# Patient Record
Sex: FEMALE | Race: WHITE | NOT HISPANIC OR LATINO | Employment: OTHER | URBAN - METROPOLITAN AREA
[De-identification: names, ages, dates, MRNs, and addresses within clinical notes are randomized per-mention and may not be internally consistent; named-entity substitution may affect disease eponyms.]

---

## 2018-09-26 ENCOUNTER — AMBULATORY - HEALTHEAST (OUTPATIENT)
Dept: PHYSICAL THERAPY | Facility: REHABILITATION | Age: 83
End: 2018-09-26

## 2018-09-26 DIAGNOSIS — I63.9 CEREBROVASCULAR ACCIDENT (CVA) (H): ICD-10-CM

## 2018-10-11 ENCOUNTER — OFFICE VISIT - HEALTHEAST (OUTPATIENT)
Dept: PHYSICAL THERAPY | Facility: REHABILITATION | Age: 83
End: 2018-10-11

## 2018-10-11 DIAGNOSIS — R26.81 UNSTEADINESS ON FEET: ICD-10-CM

## 2018-12-17 ENCOUNTER — HOSPITAL ENCOUNTER (OUTPATIENT)
Dept: RADIOLOGY | Facility: HOSPITAL | Age: 83
Discharge: HOME | End: 2018-12-17

## 2018-12-17 DIAGNOSIS — Z96.641 HISTORY OF TOTAL RIGHT HIP ARTHROPLASTY: ICD-10-CM

## 2020-08-25 ENCOUNTER — OFFICE VISIT (OUTPATIENT)
Dept: NEUROLOGY | Facility: CLINIC | Age: 85
End: 2020-08-25
Payer: MEDICARE

## 2020-08-25 VITALS — HEIGHT: 64 IN | WEIGHT: 135 LBS | BODY MASS INDEX: 23.05 KG/M2

## 2020-08-25 DIAGNOSIS — F02.80 LATE ONSET ALZHEIMER'S DISEASE WITHOUT BEHAVIORAL DISTURBANCE (H): Primary | ICD-10-CM

## 2020-08-25 DIAGNOSIS — G30.1 LATE ONSET ALZHEIMER'S DISEASE WITHOUT BEHAVIORAL DISTURBANCE (H): Primary | ICD-10-CM

## 2020-08-25 DIAGNOSIS — I63.342: ICD-10-CM

## 2020-08-25 DIAGNOSIS — R55 SYNCOPE AND COLLAPSE: ICD-10-CM

## 2020-08-25 PROCEDURE — 99442 ZZC PHYSICIAN TELEPHONE EVALUATION 11-20 MIN: CPT | Performed by: PSYCHIATRY & NEUROLOGY

## 2020-08-25 RX ORDER — KETOROLAC TROMETHAMINE 5 MG/ML
SOLUTION OPHTHALMIC
COMMUNITY
Start: 2020-03-02

## 2020-08-25 RX ORDER — QUETIAPINE FUMARATE 25 MG/1
TABLET, FILM COATED ORAL
COMMUNITY
Start: 2020-05-29

## 2020-08-25 RX ORDER — MULTIPLE VITAMINS W/ MINERALS TAB 9MG-400MCG
1 TAB ORAL DAILY
COMMUNITY

## 2020-08-25 RX ORDER — CALCIUM CARBONATE 500(1250)
500 TABLET ORAL
COMMUNITY
End: 2021-12-21

## 2020-08-25 RX ORDER — MEMANTINE HYDROCHLORIDE 10 MG/1
10 TABLET ORAL
COMMUNITY
Start: 2020-05-29 | End: 2021-06-22

## 2020-08-25 RX ORDER — FLECAINIDE ACETATE 50 MG/1
TABLET ORAL
COMMUNITY
Start: 2020-06-04

## 2020-08-25 RX ORDER — TIMOLOL MALEATE 5 MG/ML
SOLUTION/ DROPS OPHTHALMIC
COMMUNITY
Start: 2020-06-13

## 2020-08-25 ASSESSMENT — MIFFLIN-ST. JEOR: SCORE: 1022.36

## 2020-08-25 NOTE — LETTER
"    8/25/2020         RE: Linda Selby  [address] [place] [address]        Dear Colleague,    Thank you for referring your patient, Linda Selby, to the [facility] NEUR[facility] [place]. Please see a copy of my visit note below.    Telephone follow-up visit requested by patient  Telephone follow-up visit due to the COVID-19 pandemic  Telephone number [phone]  Patient identified   helps with visit as patient is demented          .  The patient has been notified of following:     \"This telephone visit will be conducted via a call between you and your physician/provider. We have found that certain health care needs can be provided without the need for a physical exam. This service lets us provide the care you need with a short phone conversation. If a prescription is necessary we can send it directly to your pharmacy. If lab work is needed we can place an order for that and you can then stop by our lab to have the test done at a later time.    If during the course of the call the physician/provider feels a telephone visit is not appropriate, you will not be charged for this service.\"     Patient has given verbal consent for Telephone visit? Yes  Consent has been obtained for this service by 1 care team member: yes.                89 year-old with dementia    Previous slums score 7 out of 30  She is very sweet cordial well taking care of but really cannot recall much    On Namenda up to 10 mg twice daily she is tolerating this       since last seen  Did fall once when walking on the long without her cane  The line was very irregular  She did not hit her head did not lose consciousness it was not syncope  No headache no throwing up no new injuries no new weakness      Did have some nightmares and was placed on Seroquel by the primary  Currently taking Seroquel 25 mg tablet, half a tablet nightly to help with \"nightmares\" which are now better    No ongoing " hallucinations     states that she eats okay she sleeps okay she does not wander at night                Review of last visit  Patient was out in the garden last Sunday end of May 2020, felt like she was going to pass out  helped her into a chair she sat there for a bit and then felt worse and then kind of came around and tried a walker inside which she tried to do but then she went down.   called 911 he tells me they were taken to [facility] and she stayed overnight  They felt she had hypotension her medication was too strong for blood pressure and they adjusted that downwards    She denies hitting her head she is on the Eliquis but does not have any headache or new neurologic symptoms today    Patient is quite demented though she asked her  to answer all the questions for her but she is polite and cordial                  Complex evaluation July 1019  Past history of left cerebellar stroke with ataxia  Alzheimer's type disease with memory falloff    Fell on the driveway July 9, 2019 is on Eliquis no hematoma found intracranially    Patient states that she was on the driveway she slipped she did not lose consciousness fell hit her face on the right side she did go for evaluation  CT scan head small vessel changes moderate volume loss no intracranial hemorrhage no skull fracture did have a scalp hematoma  CT scan facial bones right supraorbital hematoma but no fracture  CT cervical spine no fracture see official report degenerative disease          Current problems/neurologic difficulties    1. Chronic left cerebellar stroke.  2. Status post TPA due to left leg weakness on April 22, 2018.  3. Intracranial atherosclerosis with bilateral P2 and left A2/A3 vessels disease.  4. Had some difficulty with hyponatremia.  5. Had some difficulty with cognition.  6. Transient left-sided weakness with old cerebellar stroke.  7. Fell July 9, 2019 ecchymoses right super orbital ridge scalp  hematoma no intracranial hemorrhage on scans    Workup in April 2018:    1. MRI scan of the brain:  a. No acute stroke or bleed, mild atrophy, chronic small vessel changes.  b. Chronic infarct left cerebellar hemisphere.  2. CT scan of the head, no hemorrhage, small vessel changes.  3. CTA of the intracranial vessels:  a. Moderate stenosis, left A2-A3 segments.  b. Mild stenosis P2 cerebral arteries bilaterally.  4. CTA of the neck vessels, no significant stenosis.  5. HDL 76, .  6. Echo 69% ejection fraction, mild aortic stenosis, left atrium normal size.  7. SLUMS score significantly abnormal 7/30.  8.  B12 was 840 and a TSH was 3.62 back in April 2018 when she was in hospital at [facility].      Past medical history:    1. Cerebellar stroke on the left as above.  2. Aortic valve stenosis with aortic valve replacement.  3. Hypertension.  4. Pyloric stenosis.  5. Episodic hyponatremia in November 2016.  6. History of shoulder arthroscopy and hip surgery.  7. Poor memory with SLUMS score of 7/31 while hospitalized.    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    Family history is positive for   Mother with stroke.   Father lived to be 95.                       Review of Systems    Severely demented difficult  No headache no chest pain  No shortness of breath or abdominal pain  No nausea vomiting or diarrhea fever chills  Eating okay  No passing out  No diplopia dysarthria dysphasia  Seems to swallow okay  Chronic difficulty with gait and balance but forgets to use her cane           Patient actually can talk okay  Difficult to say but may have a mild paraphasic error    Past slums score 7 out of 30   patient is cordial and polite      Per   Cranials 2 through 12 significant for  Mild decreased hearing   states that her face is symmetrical    Moves her arms and legs well    Has more of a left leg clumsiness when she ambulates with a  "wide-based stance from her stroke    She should use a single prong cane more often but she kind of forgets to take it with her Assessment/Plan Alzheimer disease (G30.1) Namenda 10 mg p.o. twice daily  End-stage dementia 7 out of 30 on her Tor cognitive assessment score    Not a good candidate for Aricept as she is had bradycardia and frequent falls                  Currently, she has:    1. Dementia with some nightmares, question Alzheimer s versus Lewy body with leaning towards Alzheimer s more, end stage with some nightmares.  (Started on Seroquel 12.5 mg nightly by primary sleeping better)  2. Chronic left cerebellar stroke with ataxia.  3. Intracranial atherosclerosis.  4. Syncope, not a good candidate for Aricept with the bradycardia.  5. Fall with hitting her head 7/9/2019 CT scan negative for subdural hematoma if getting worse in any way would need a repeat to look for delayed subdural as she is on the Xarelto  6. Syncope with low blood pressure end of May 2020 taken to [facility]    Current plan:    1. Continue memantine which she is using 10 mg twice daily.  2. We discussed gait safety at length  3. Reviewed with  that if neurologic symptoms or signs change we need a follow-up CT scan of the head to look for delayed subdural hematoma  4. discussed her poor memory which is getting worse continue on the Namenda 10 mg twice daily  5. Follow-up in 6 months  6.  On 12.5 mg of Seroquel at nighttime for her \"nightmares\" doing better        Concerned that patient is elderly and has had strokes and now has dementia   is trying to do the best he can keep her safe  No new complaints today          Episode of syncope in the past  Alzheimer disease     Cerebrovascular accident (CVA) (I63.342)   Patient is on Eliquis controlled by her other physicians risk factor reduction per her other physicians    15 minutes of direct discussion time with patient and  about the above telephone " visit.        Again, thank you for allowing me to participate in the care of your patient.        Sincerely,        Lan Shin MD

## 2020-08-25 NOTE — PATIENT INSTRUCTIONS
Patient Education     Alzheimer Disease  Alzheimer disease is a brain illness that can happen usually in older adults, but it can also happen as early as age 40. It is the most common cause of dementia. It is a progressive disease. This means it gets worse over time.  What is Alzheimer disease?  Alzheimer disease causes a series of changes to nerves of the brain. Some nerves form into clumps and tangles, and lose some of their connections to other nerves.  Healthcare providers don t fully understand what causes Alzheimer disease. But they think these may be some of the causes:    Age and family history    Certain genes    Abnormal protein deposits in the brain    Environmental factors    Problems with a person s immune system    Possibly infections  Symptoms of Alzheimer disease  The disease causes changes in behavior and thinking known as dementia. The symptoms include:    Memory loss    Confusion    Restlessness    Personality and behavior changes    Problems with judgment    Problems communicating with others    Inability to follow directions    Lack of emotion  Diagnosing Alzheimer disease  No single test is able to diagnose Alzheimer disease. Instead healthcare providers use a series of tests to rule out other health conditions. The tests may include:    A complete medical history. This may include questions about overall health and past health problems. The healthcare provider may ask how well the person can do daily tasks. The healthcare provider may ask family or close friends about any changes in behavior or personality.    Mental status test. This is a test of memory, problem solving, attention, counting, and language.     Standard medical tests. These may include blood and urine tests to find possible causes for the problem.    Brain imaging tests.  CT, MRI, or positron emission tomography (PET) may be used to rule out other causes of the problem.  Treating Alzheimer disease  Alzheimer disease has no  cure. Instead healthcare providers can help ease some symptoms. This can make a person with Alzheimer more comfortable. Treatment can also make it easier for their caregivers to take care of them.  Some medicines may help slow the decline of a person s memory, thinking, and language skills. They may help with problems of behavior, such as aggression. They can lessen hallucinations and delusions. These medicines can work for some but not all people. And they may help for only a limited time. Medicines include:    Cholinesterase inhibitors    Donepezil    Galantamine    Rivastigmine    Memantine  In some cases, behavior problems can be caused by medicine side effects. Talk with the person s healthcare provider about all medicines he or she is taking.  Keeping healthy  For a person with Alzheimer, it s important to stay healthy. Good nutrition and physical and social activity are vital. A calm and well-structured environment will help. Make sure to keep up with healthcare appointments and managing other health conditions, such as diabetes. Some people benefit from having a nutritionist help to prevent weight loss.    Caring for someone with Alzheimer  A person with Alzheimer will need more caregiving over time. Talk with your healthcare provider about caregiving resources.   Date Last Reviewed: 4/1/2018 2000-2019 [employer]. [address] [place] [address], [place] [address]. All rights reserved. This information is not intended as a substitute for professional medical care. Always follow your healthcare professional's instructions.

## 2020-08-25 NOTE — NURSING NOTE
Chief Complaint   Patient presents with     Follow Up     Kendell f/u      René garzamarcello to help with visit-  Phone call: [phone]    Maricruz Fountain ATC

## 2021-02-03 ENCOUNTER — IMMUNIZATION (OUTPATIENT)
Dept: NURSING | Facility: CLINIC | Age: 86
End: 2021-02-03
Payer: MEDICARE

## 2021-02-03 PROCEDURE — 91300 PR COVID VAC PFIZER DIL RECON 30 MCG/0.3 ML IM: CPT

## 2021-02-03 PROCEDURE — 0001A PR COVID VAC PFIZER DIL RECON 30 MCG/0.3 ML IM: CPT

## 2021-02-24 ENCOUNTER — IMMUNIZATION (OUTPATIENT)
Dept: NURSING | Facility: CLINIC | Age: 86
End: 2021-02-24
Attending: FAMILY MEDICINE
Payer: MEDICARE

## 2021-02-24 PROCEDURE — 0002A PR COVID VAC PFIZER DIL RECON 30 MCG/0.3 ML IM: CPT

## 2021-02-24 PROCEDURE — 91300 PR COVID VAC PFIZER DIL RECON 30 MCG/0.3 ML IM: CPT

## 2021-03-13 ENCOUNTER — HEALTH MAINTENANCE LETTER (OUTPATIENT)
Age: 86
End: 2021-03-13

## 2021-03-25 ENCOUNTER — HOSPITAL ENCOUNTER (OUTPATIENT)
Dept: ULTRASOUND IMAGING | Facility: HOSPITAL | Age: 86
Discharge: HOME | End: 2021-03-25

## 2021-03-25 ENCOUNTER — RECORDS - HEALTHEAST (OUTPATIENT)
Dept: ADMINISTRATIVE | Facility: OTHER | Age: 86
End: 2021-03-25

## 2021-03-25 DIAGNOSIS — M79.604 RIGHT LEG PAIN: ICD-10-CM

## 2021-03-25 DIAGNOSIS — M79.89 CALF SWELLING: ICD-10-CM

## 2021-05-24 ENCOUNTER — RECORDS - HEALTHEAST (OUTPATIENT)
Dept: ADMINISTRATIVE | Facility: CLINIC | Age: 86
End: 2021-05-24

## 2021-05-26 ENCOUNTER — RECORDS - HEALTHEAST (OUTPATIENT)
Dept: ADMINISTRATIVE | Facility: CLINIC | Age: 86
End: 2021-05-26

## 2021-05-27 ENCOUNTER — RECORDS - HEALTHEAST (OUTPATIENT)
Dept: ADMINISTRATIVE | Facility: CLINIC | Age: 86
End: 2021-05-27

## 2021-05-28 ENCOUNTER — RECORDS - HEALTHEAST (OUTPATIENT)
Dept: ADMINISTRATIVE | Facility: CLINIC | Age: 86
End: 2021-05-28

## 2021-06-20 NOTE — PROGRESS NOTES
"Patient comes in today for PT evaluation of gait. Patient is with her  and they don't know why she is here. We discussed what her order says from Dr. Shin. Patient declines therapy services, as she feels \"much stronger\" and is \"walking much better since I've seen my doctor\". She was given a card for the clinic if she notices any change in her balance/gait or changes her mind regarding therapy. Patient was not charged for today's visit.    Tim Ball  "

## 2021-06-22 ENCOUNTER — OFFICE VISIT (OUTPATIENT)
Dept: NEUROLOGY | Facility: CLINIC | Age: 86
End: 2021-06-22
Payer: MEDICARE

## 2021-06-22 VITALS
HEART RATE: 70 BPM | BODY MASS INDEX: 24.35 KG/M2 | DIASTOLIC BLOOD PRESSURE: 72 MMHG | HEIGHT: 60 IN | SYSTOLIC BLOOD PRESSURE: 157 MMHG | WEIGHT: 124 LBS

## 2021-06-22 DIAGNOSIS — F02.818 LATE ONSET ALZHEIMER'S DISEASE WITH BEHAVIORAL DISTURBANCE (H): Primary | ICD-10-CM

## 2021-06-22 DIAGNOSIS — G30.1 LATE ONSET ALZHEIMER'S DISEASE WITH BEHAVIORAL DISTURBANCE (H): Primary | ICD-10-CM

## 2021-06-22 PROCEDURE — 99214 OFFICE O/P EST MOD 30 MIN: CPT | Performed by: PSYCHIATRY & NEUROLOGY

## 2021-06-22 RX ORDER — TRIAMCINOLONE ACETONIDE 1 MG/G
CREAM TOPICAL
COMMUNITY
Start: 2021-01-19

## 2021-06-22 RX ORDER — TRAVOPROST OPHTHALMIC SOLUTION 0.04 MG/ML
1 SOLUTION OPHTHALMIC
COMMUNITY
Start: 2018-09-21

## 2021-06-22 RX ORDER — MEMANTINE HYDROCHLORIDE 10 MG/1
10 TABLET ORAL 2 TIMES DAILY
Qty: 180 TABLET | Refills: 3 | Status: SHIPPED | OUTPATIENT
Start: 2021-06-22 | End: 2021-12-21

## 2021-06-22 ASSESSMENT — MIFFLIN-ST. JEOR: SCORE: 903.96

## 2021-06-22 NOTE — LETTER
6/22/2021         RE: Linda Selby  [address] [place] [address]        Dear Colleague,    Thank you for referring your patient, Linda Selby, to the [facility] NEUROLOGY CL[facility]IC [place]. Please see a copy of my visit note below.    In person visit  Accompanied by  who helps give the history      HPI  7/16/2029 in person visit  5/29/2020, telephone visit  8/25/2020, telephone visit  6/22/2021, in person visit    89-year-old being followed neurologically for:  Dementia with previous slums score of 7 out of 30  History of chronic left cerebellar stroke 2018  Intracranial atherosclerosis  Past fall 2019 while on Eliquis no intracranial bleed    Patient has poor memory recall  Says that she likes to watch TV has a favorite show but cannot remember what it is  Shechit chats is cordial well cared for.    Sometimes a fall impulsive when she is walking has not had any injuries  Eats okay  Hangs onto the railing when she does stairs  Has a grab bar in the shower  Hearing is okay  No hallucinations or problems at night now on Seroquel low-dose 12.5 mg     is 89 and helps care for her    Discussed cardiology work-up for her dizziness with the SVT  They are not going to do any other invasive testing or treatments  Both understand the risk of falling with being on the anticoagulant Eliquis      Complex evaluation July 1019  Past history of left cerebellar stroke with ataxia  Alzheimer's type disease with memory falloff  Fell on the driveway July 9, 2019 is on Eliquis no hematoma found intracranially    Patient states that she was on the driveway she slipped she did not lose consciousness fell hit her face on the right side she did go for evaluation  CT scan head small vessel changes moderate volume loss no intracranial hemorrhage no skull fracture did have a scalp hematoma  CT scan facial bones right supraorbital hematoma but no fracture  CT cervical spine no fracture see  official report degenerative disease      Current problems/neurologic difficulties    1. Chronic left cerebellar stroke.  2. Status post TPA due to left leg weakness on April 22, 2018.  3. Intracranial atherosclerosis with bilateral P2 and left A2/A3 vessels disease.  4. Had some difficulty with hyponatremia.  5. Had some difficulty with cognition.  6. Transient left-sided weakness with old cerebellar stroke.  7. Fell July 9, 2019 ecchymoses right super orbital ridge scalp hematoma no intracranial hemorrhage on scans    Workup in April 2018:    1. MRI scan of the brain:  a. No acute stroke or bleed, mild atrophy, chronic small vessel changes.  b. Chronic infarct left cerebellar hemisphere.  2. CT scan of the head, no hemorrhage, small vessel changes.  3. CTA of the intracranial vessels:  a. Moderate stenosis, left A2-A3 segments.  b. Mild stenosis P2 cerebral arteries bilaterally.  4. CTA of the neck vessels, no significant stenosis.  5. HDL 76, .  6. Echo 69% ejection fraction, mild aortic stenosis, left atrium normal size.  7. SLUMS score significantly abnormal 7/30.  8.  B12 was 840 and a TSH was 3.62 back in April 2018 when she was in hospital at [facility]   9.  Holter monitor March 2021 showed runs of V. tach correlating with dizziness  10.  Leg swelling ultrasound March 2021-03-25 no DVT  11.  Echo repeat 2021-03-25 60-65% action fraction biatrial enlargement history of paroxysmal atrial fibrillation    Past medical history:     Cerebellar stroke on the left as above.   Aortic valve stenosis with aortic valve replacement.  Paroxysmal atrial fibrillation   Hypertension.   Pyloric stenosis.   Episodic hyponatremia in November 2016.   History of shoulder arthroscopy and hip surgery.   Poor memory with SLUMS score of 7/31 while hospitalized.    Habits  Past smoker quit  Does not drink alcohol  Patient is a retired nurse        Family history is positive for   Mother with stroke.   Father lived to be  95.        Exam     Review of Systems    Severely demented difficult does not really have any complaints  No headache no chest pain  No shortness of breath or abdominal pain  No nausea vomiting or diarrhea fever chills  Eating okay  No passing out but does have some dizziness  No diplopia dysarthria dysphasia  Seems to swallow okay  Chronic difficulty with gait and balance but forgets to use her cane      General exam   HEENT says that she can hear okay  Has some bruising on her limbs is on Eliquis  Lungs clear  Heart rate regular  Abdomen soft  Symmetrical pulses  No edema the feet      Neurologic exam  Alert and attentive  Prosody speech normal  Naming normal  Repetition normal  Comprehension normal  In the past with make occasional paraphasic errors  No severe aphasia today  Memory in the past 7 out of 30 for slums  Does not know current events does not know the president does not know the year  Poor insight but pleasant  No neglect  Right left discrimination okay  Easily perplexed    Cranials 2 through 12 normal  No hemiplegia  No nystagmus  Visual fields intact  Face symmetrical  Tongue twisters good    Upper extremities  No drift no tremor normal finger-nose    Lower extremities  Distal proximal strength good    Gait  Ambulates independently but is little impulsive needs to careful          Assessment/plan:    Currently, she has:    1. Dementia with some nightmares, question Alzheimer s versus Lewy body with leaning towards Alzheimer s more, end stage with some nightmares.  (Started on Seroquel 12.5 mg nightly by primary sleeping better)  2. Chronic left cerebellar stroke with ataxia.  3. Intracranial atherosclerosis.  4. Syncope, not a good candidate for Aricept with the bradycardia.  Also had V. tach arrhythmias on a Holter monitor 3/3/2021  5. Fall with hitting her head 7/9/2019 CT scan negative for subdural hematoma if getting worse in any way would need a repeat to look for delayed subdural as she is on  the Xarelto  6. Syncope with low blood pressure end of May 2020 taken to [facility]        1.  Alzheimer's disease (G30.9)       Namenda 10 mg twice daily       Severe memory falloff past slums score 7 out of 30       History of syncope not a good candidate with history of bradycardia for Aricept       Seroquel 12.5 mg at nighttime per primary MD due to nightmares    Concerned that patient is elderly and has had strokes and now has dementia   is trying to do the best he can keep her safe      2.  Cerebrovascular accident (CVA) (I63.342)        Left cerebellar stroke       Difficulty with balance, discussed gait safety and risks       Aortic valve replacement and paroxysmal atrial fibrillation on Eliquis       Patient is on Eliquis controlled by her other physicians risk factor reduction per her other physicians       intracranial atherosclerosis      3.  Episodic dizziness/syncope       Cardiology work-up 2021-03-03, Holter runs of ARGELIA tach correlated with dizziness       Due to age and dementia further pursued and work-up were declined      Total care time today 38 minutes discussing and evaluating the above    As part of the visit today  Reviewed cardiology notes and work-up 2021-03-03, 2021-03-25, echo March 2021  Ultrasound negative for DVT March 2021  Holter monitor  Outside records and labs        Again, thank you for allowing me to participate in the care of your patient.        Sincerely,        Lan Shin MD

## 2021-06-22 NOTE — NURSING NOTE
Chief Complaint   Patient presents with     Dementia     Kendal Shaikh LPN on 6/22/2021 at 11:36 AM

## 2021-06-22 NOTE — PROGRESS NOTES
In person visit  Accompanied by  who helps give the history      HPI  7/16/2029 in person visit  5/29/2020, telephone visit  8/25/2020, telephone visit  6/22/2021, in person visit    89-year-old being followed neurologically for:  Dementia with previous slums score of 7 out of 30  History of chronic left cerebellar stroke 2018  Intracranial atherosclerosis  Past fall 2019 while on Eliquis no intracranial bleed    Patient has poor memory recall  Says that she likes to watch TV has a favorite show but cannot remember what it is  Shesimone andres is cordial well cared for.    Sometimes a fall impulsive when she is walking has not had any injuries  Eats okay  Hangs onto the railing when she does stairs  Has a grab bar in the shower  Hearing is okay  No hallucinations or problems at night now on Seroquel low-dose 12.5 mg     is 89 and helps care for her    Discussed cardiology work-up for her dizziness with the SVT  They are not going to do any other invasive testing or treatments  Both understand the risk of falling with being on the anticoagulant Eliquis      Complex evaluation July 1019  Past history of left cerebellar stroke with ataxia  Alzheimer's type disease with memory falloff  Fell on the driveway July 9, 2019 is on Eliquis no hematoma found intracranially    Patient states that she was on the driveway she slipped she did not lose consciousness fell hit her face on the right side she did go for evaluation  CT scan head small vessel changes moderate volume loss no intracranial hemorrhage no skull fracture did have a scalp hematoma  CT scan facial bones right supraorbital hematoma but no fracture  CT cervical spine no fracture see official report degenerative disease      Current problems/neurologic difficulties    1. Chronic left cerebellar stroke.  2. Status post TPA due to left leg weakness on April 22, 2018.  3. Intracranial atherosclerosis with bilateral P2 and left A2/A3 vessels disease.  4. Had  some difficulty with hyponatremia.  5. Had some difficulty with cognition.  6. Transient left-sided weakness with old cerebellar stroke.  7. Fell July 9, 2019 ecchymoses right super orbital ridge scalp hematoma no intracranial hemorrhage on scans    Workup in April 2018:    1. MRI scan of the brain:  a. No acute stroke or bleed, mild atrophy, chronic small vessel changes.  b. Chronic infarct left cerebellar hemisphere.  2. CT scan of the head, no hemorrhage, small vessel changes.  3. CTA of the intracranial vessels:  a. Moderate stenosis, left A2-A3 segments.  b. Mild stenosis P2 cerebral arteries bilaterally.  4. CTA of the neck vessels, no significant stenosis.  5. HDL 76, .  6. Echo 69% ejection fraction, mild aortic stenosis, left atrium normal size.  7. SLUMS score significantly abnormal 7/30.  8.  B12 was 840 and a TSH was 3.62 back in April 2018 when she was in hospital at [facility]   9.  Holter monitor March 2021 showed runs of V. tach correlating with dizziness  10.  Leg swelling ultrasound March 2021-03-25 no DVT  11.  Echo repeat 2021-03-25 60-65% action fraction biatrial enlargement history of paroxysmal atrial fibrillation    Past medical history:     Cerebellar stroke on the left as above.   Aortic valve stenosis with aortic valve replacement.  Paroxysmal atrial fibrillation   Hypertension.   Pyloric stenosis.   Episodic hyponatremia in November 2016.   History of shoulder arthroscopy and hip surgery.   Poor memory with SLUMS score of 7/31 while hospitalized.    Habits  Past smoker quit  Does not drink alcohol  Patient is a retired nurse        Family history is positive for   Mother with stroke.   Father lived to be 95.        Exam     Review of Systems    Severely demented difficult does not really have any complaints  No headache no chest pain  No shortness of breath or abdominal pain  No nausea vomiting or diarrhea fever chills  Eating okay  No passing out but does have some dizziness  No  diplopia dysarthria dysphasia  Seems to swallow okay  Chronic difficulty with gait and balance but forgets to use her cane      General exam   HEENT says that she can hear okay  Has some bruising on her limbs is on Eliquis  Lungs clear  Heart rate regular  Abdomen soft  Symmetrical pulses  No edema the feet      Neurologic exam  Alert and attentive  Prosody speech normal  Naming normal  Repetition normal  Comprehension normal  In the past with make occasional paraphasic errors  No severe aphasia today  Memory in the past 7 out of 30 for slums  Does not know current events does not know the president does not know the year  Poor insight but pleasant  No neglect  Right left discrimination okay  Easily perplexed    Cranials 2 through 12 normal  No hemiplegia  No nystagmus  Visual fields intact  Face symmetrical  Tongue twisters good    Upper extremities  No drift no tremor normal finger-nose    Lower extremities  Distal proximal strength good    Gait  Ambulates independently but is little impulsive needs to careful          Assessment/plan:    Currently, she has:    1. Dementia with some nightmares, question Alzheimer s versus Lewy body with leaning towards Alzheimer s more, end stage with some nightmares.  (Started on Seroquel 12.5 mg nightly by primary sleeping better)  2. Chronic left cerebellar stroke with ataxia.  3. Intracranial atherosclerosis.  4. Syncope, not a good candidate for Aricept with the bradycardia.  Also had V. tach arrhythmias on a Holter monitor 3/3/2021  5. Fall with hitting her head 7/9/2019 CT scan negative for subdural hematoma if getting worse in any way would need a repeat to look for delayed subdural as she is on the Xarelto  6. Syncope with low blood pressure end of May 2020 taken to [facility]        1.  Alzheimer's disease (G30.9)       Namenda 10 mg twice daily       Severe memory falloff past slums score 7 out of 30       History of syncope not a good candidate with history of  bradycardia for Aricept       Seroquel 12.5 mg at nighttime per primary MD due to nightmares    Concerned that patient is elderly and has had strokes and now has dementia   is trying to do the best he can keep her safe      2.  Cerebrovascular accident (CVA) (I63.342)        Left cerebellar stroke       Difficulty with balance, discussed gait safety and risks       Aortic valve replacement and paroxysmal atrial fibrillation on Eliquis       Patient is on Eliquis controlled by her other physicians risk factor reduction per her other physicians       intracranial atherosclerosis      3.  Episodic dizziness/syncope       Cardiology work-up 2021-03-03, Holter runs of ARGELIA tach correlated with dizziness       Due to age and dementia further pursued and work-up were declined      Total care time today 38 minutes discussing and evaluating the above    As part of the visit today  Reviewed cardiology notes and work-up 2021-03-03, 2021-03-25, echo March 2021  Ultrasound negative for DVT March 2021  Holter monitor  Outside records and labs

## 2021-07-13 ENCOUNTER — RECORDS - HEALTHEAST (OUTPATIENT)
Dept: ADMINISTRATIVE | Facility: CLINIC | Age: 86
End: 2021-07-13

## 2021-07-21 ENCOUNTER — RECORDS - HEALTHEAST (OUTPATIENT)
Dept: ADMINISTRATIVE | Facility: CLINIC | Age: 86
End: 2021-07-21

## 2021-10-23 ENCOUNTER — HEALTH MAINTENANCE LETTER (OUTPATIENT)
Age: 86
End: 2021-10-23

## 2021-12-21 ENCOUNTER — OFFICE VISIT (OUTPATIENT)
Dept: NEUROLOGY | Facility: CLINIC | Age: 86
End: 2021-12-21
Payer: MEDICARE

## 2021-12-21 VITALS
WEIGHT: 124 LBS | BODY MASS INDEX: 24.35 KG/M2 | HEART RATE: 76 BPM | HEIGHT: 60 IN | SYSTOLIC BLOOD PRESSURE: 134 MMHG | DIASTOLIC BLOOD PRESSURE: 54 MMHG

## 2021-12-21 DIAGNOSIS — G30.1 LATE ONSET ALZHEIMER'S DISEASE WITH BEHAVIORAL DISTURBANCE (H): ICD-10-CM

## 2021-12-21 DIAGNOSIS — F02.818 LATE ONSET ALZHEIMER'S DISEASE WITH BEHAVIORAL DISTURBANCE (H): ICD-10-CM

## 2021-12-21 PROCEDURE — 99213 OFFICE O/P EST LOW 20 MIN: CPT | Performed by: PSYCHIATRY & NEUROLOGY

## 2021-12-21 RX ORDER — MEMANTINE HYDROCHLORIDE 10 MG/1
10 TABLET ORAL 2 TIMES DAILY
Qty: 180 TABLET | Refills: 3 | Status: SHIPPED | OUTPATIENT
Start: 2021-12-21

## 2021-12-21 ASSESSMENT — MIFFLIN-ST. JEOR: SCORE: 903.96

## 2021-12-21 NOTE — NURSING NOTE
Chief Complaint   Patient presents with     Dementia     Follow up for memory.     Kendal Shaikh LPN on 12/21/2021 at 1:40 PM

## 2021-12-21 NOTE — PROGRESS NOTES
In person visit  Accompanied by  who helps give the history      HPI  7/16/2029 in person visit  5/29/2020, telephone visit  8/25/2020, telephone visit  6/22/2021, in person visit  12/21/2021, in person visit      89-year-old being followed neurologically for:  Dementia with previous slums score of 7 out of 30  History of chronic left cerebellar stroke 2018  Intracranial atherosclerosis  Past fall 2019, while on Eliquis no intracranial bleed    Since seen about 6 months ago  No hospitalizations  No surgeries    Occasionally will ask of her  knows with another person in the room he is but no other severe hallucinations  Bad dreams are better on 12.5 mg of Seroquel through the primary    Patient very hard of hearing no difficulty asked her questions    She does not have any heartburn or diarrhea    She might like to watch TV but cannot remember what it was    Her balance is not so good I try to say she should go down the basement  He needs to be careful when she is up and about      Patient has poor memory recall  Says that she likes to watch TV has a favorite show but cannot remember what it is  She chit chats is cordial well cared for.    Sometimes a fall impulsive when she is walking has not had any injuries  Eats okay  Hangs onto the railing when she does stairs  Has a grab bar in the shower  Hearing is poor     is 89 and helps care for her    Discussed cardiology work-up for her dizziness with the SVT  They are not going to do any other invasive testing or treatments  Both understand the risk of falling with being on the anticoagulant Eliquis      Complex evaluation July 1019  Past history of left cerebellar stroke with ataxia  Alzheimer's type disease with memory falloff  Fell on the driveway July 9, 2019 is on Eliquis no hematoma found intracranially    Patient states that she was on the driveway she slipped she did not lose consciousness fell hit her face on the right side she did go for  evaluation  CT scan head small vessel changes moderate volume loss no intracranial hemorrhage no skull fracture did have a scalp hematoma  CT scan facial bones right supraorbital hematoma but no fracture  CT cervical spine no fracture see official report degenerative disease      Current problems/neurologic difficulties    1. Chronic left cerebellar stroke.  2. Status post TPA due to left leg weakness on April 22, 2018.  3. Intracranial atherosclerosis with bilateral P2 and left A2/A3 vessels disease.  4. Had some difficulty with hyponatremia.  5. Had some difficulty with cognition.  6. Transient left-sided weakness with old cerebellar stroke.  7. Fell July 9, 2019 ecchymoses right super orbital ridge scalp hematoma no intracranial hemorrhage on scans    Workup in April 2018:    1. MRI scan of the brain:  a. No acute stroke or bleed, mild atrophy, chronic small vessel changes.  b. Chronic infarct left cerebellar hemisphere.  2. CT scan of the head, no hemorrhage, small vessel changes.  3. CTA of the intracranial vessels:  a. Moderate stenosis, left A2-A3 segments.  b. Mild stenosis P2 cerebral arteries bilaterally.  4. CTA of the neck vessels, no significant stenosis.  5. HDL 76, .  6. Echo 69% ejection fraction, mild aortic stenosis, left atrium normal size.  7. SLUMS score significantly abnormal 7/30.  8.  B12 was 840 and a TSH was 3.62 back in April 2018 when she was in hospital at [facility]   9.  Holter monitor March 2021 showed runs of V. tach correlating with dizziness  10.  Leg swelling ultrasound March 2021-03-25 no DVT  11.  Echo repeat 2021-03-25 60-65% action fraction biatrial enlargement history of paroxysmal atrial fibrillation    Past medical history:     Cerebellar stroke on the left as above.   Aortic valve stenosis with aortic valve replacement.  Paroxysmal atrial fibrillation   Hypertension.   Pyloric stenosis.   Episodic hyponatremia in November 2016.   History of shoulder arthroscopy and  hip surgery.   Poor memory with SLUMS score of 7/31 while hospitalized.    Habits  Past smoker quit  Does not drink alcohol  Patient is a retired nurse        Family history is positive for   Mother with stroke.   Father lived to be 95.        Exam     Review of Systems    Severely demented difficult does not really have any complaints  No headache no chest pain  No shortness of breath or abdominal pain  No nausea vomiting or diarrhea fever chills  Eating okay  No passing out but does have some dizziness  No diplopia dysarthria dysphasia  Seems to swallow okay  Chronic difficulty with gait and balance but forgets to use her cane      General exam  Blood pressure 134/54, pulse 76  HEENT says that she can hear okay  Has some bruising on her limbs is on Eliquis  Lungs clear  Heart rate regular  Abdomen soft  Symmetrical pulses  No edema the feet      Neurologic exam  Alert and attentive  Prosody speech normal  Naming normal  Repetition normal  Comprehension normal  In the past with make occasional paraphasic errors  No severe aphasia today  Memory in the past 7 out of 30 for slums  Does not know current events does not know the president does not know the year  Poor insight but pleasant  No neglect  Right left discrimination okay  Easily perplexed    Cranials 2 through 12 normal  No hemiplegia  No nystagmus  Visual fields intact  Face symmetrical  Tongue twisters good    Upper extremities  No drift no tremor normal finger-nose    Lower extremities  Distal proximal strength good    Gait  Ambulates independently but is little impulsive needs to careful          Assessment/plan:    Currently, she has:    1. Dementia with some nightmares, question Alzheimer s versus Lewy body with leaning towards Alzheimer s more, end stage with some nightmares.  (Started on Seroquel 12.5 mg nightly by primary sleeping better)  2. Chronic left cerebellar stroke with ataxia.  3. Intracranial atherosclerosis.  4. Syncope, not a good  candidate for Aricept with the bradycardia.  Also had V. tach arrhythmias on a Holter monitor 3/3/2021  5. Fall with hitting her head 7/9/2019 CT scan negative for subdural hematoma if getting worse in any way would need a repeat to look for delayed subdural as she is on the Xarelto  6. Syncope with low blood pressure end of May 2020 taken to [facility]        1.  Alzheimer's disease (G30.9)       Namenda 10 mg twice daily       Severe memory falloff past slums score 7 out of 30       History of syncope not a good candidate with history of bradycardia for Aricept       Seroquel 12.5 mg at nighttime per primary MD due to nightmares    Concerned that patient is elderly and has had strokes and now has dementia   is trying to do the best he can keep her safe      2.  Cerebrovascular accident (CVA) (I63.342)        Left cerebellar stroke       Difficulty with balance, discussed gait safety and risks       Aortic valve replacement and paroxysmal atrial fibrillation on Eliquis       Patient is on Eliquis controlled by her other physicians risk factor reduction per her other physicians       intracranial atherosclerosis      3.  Episodic dizziness/syncope       Cardiology work-up 2021-03-03, Holter runs of V. tach correlated with dizziness       Due to age and dementia further pursued and work-up were declined      Predominantly discussed good nutrition gait safety trying to avoid injuries  Patient tolerates the Seroquel at low-dose 12.5 mg at nighttime  Sometimes sees people in the room but no severe hallucinations  Elderly  cares for the patient they shop at [employer]  There is a neighbor who plows their driveway    No follow-up in 6 months    Discussed the above issues with patient and   23 minutes total care time today

## 2021-12-21 NOTE — LETTER
12/21/2021         RE: Linda Selby  [address] [place] [address]        Dear Colleague,    Thank you for referring your patient, Linda Selby, to the [facility] [place]. Please see a copy of my visit note below.    In person visit  Accompanied by  who helps give the history      HPI  7/16/2029 in person visit  5/29/2020, telephone visit  8/25/2020, telephone visit  6/22/2021, in person visit  12/21/2021, in person visit      89-year-old being followed neurologically for:  Dementia with previous slums score of 7 out of 30  History of chronic left cerebellar stroke 2018  Intracranial atherosclerosis  Past fall 2019, while on Eliquis no intracranial bleed    Since seen about 6 months ago  No hospitalizations  No surgeries    Occasionally will ask of her  knows with another person in the room he is but no other severe hallucinations  Bad dreams are better on 12.5 mg of Seroquel through the primary    Patient very hard of hearing no difficulty asked her questions    She does not have any heartburn or diarrhea    She might like to watch TV but cannot remember what it was    Her balance is not so good I try to say she should go down the basement  He needs to be careful when she is up and about      Patient has poor memory recall  Says that she likes to watch TV has a favorite show but cannot remember what it is  She chit chats is cordial well cared for.    Sometimes a fall impulsive when she is walking has not had any injuries  Eats okay  Hangs onto the railing when she does stairs  Has a grab bar in the shower  Hearing is poor     is 89 and helps care for her    Discussed cardiology work-up for her dizziness with the SVT  They are not going to do any other invasive testing or treatments  Both understand the risk of falling with being on the anticoagulant Eliquis      Complex evaluation July 1019  Past history of left cerebellar stroke with  ataxia  Alzheimer's type disease with memory falloff  Fell on the driveway July 9, 2019 is on Eliquis no hematoma found intracranially    Patient states that she was on the driveway she slipped she did not lose consciousness fell hit her face on the right side she did go for evaluation  CT scan head small vessel changes moderate volume loss no intracranial hemorrhage no skull fracture did have a scalp hematoma  CT scan facial bones right supraorbital hematoma but no fracture  CT cervical spine no fracture see official report degenerative disease      Current problems/neurologic difficulties    1. Chronic left cerebellar stroke.  2. Status post TPA due to left leg weakness on April 22, 2018.  3. Intracranial atherosclerosis with bilateral P2 and left A2/A3 vessels disease.  4. Had some difficulty with hyponatremia.  5. Had some difficulty with cognition.  6. Transient left-sided weakness with old cerebellar stroke.  7. Fell July 9, 2019 ecchymoses right super orbital ridge scalp hematoma no intracranial hemorrhage on scans    Workup in April 2018:    1. MRI scan of the brain:  a. No acute stroke or bleed, mild atrophy, chronic small vessel changes.  b. Chronic infarct left cerebellar hemisphere.  2. CT scan of the head, no hemorrhage, small vessel changes.  3. CTA of the intracranial vessels:  a. Moderate stenosis, left A2-A3 segments.  b. Mild stenosis P2 cerebral arteries bilaterally.  4. CTA of the neck vessels, no significant stenosis.  5. HDL 76, .  6. Echo 69% ejection fraction, mild aortic stenosis, left atrium normal size.  7. SLUMS score significantly abnormal 7/30.  8.  B12 was 840 and a TSH was 3.62 back in April 2018 when she was in hospital at [facility]   9.  Holter monitor March 2021 showed runs of V. tach correlating with dizziness  10.  Leg swelling ultrasound March 2021-03-25 no DVT  11.  Echo repeat 2021-03-25 60-65% action fraction biatrial enlargement history of paroxysmal atrial  fibrillation    Past medical history:     Cerebellar stroke on the left as above.   Aortic valve stenosis with aortic valve replacement.  Paroxysmal atrial fibrillation   Hypertension.   Pyloric stenosis.   Episodic hyponatremia in November 2016.   History of shoulder arthroscopy and hip surgery.   Poor memory with SLUMS score of 7/31 while hospitalized.    Habits  Past smoker quit  Does not drink alcohol  Patient is a retired nurse        Family history is positive for   Mother with stroke.   Father lived to be 95.        Exam     Review of Systems    Severely demented difficult does not really have any complaints  No headache no chest pain  No shortness of breath or abdominal pain  No nausea vomiting or diarrhea fever chills  Eating okay  No passing out but does have some dizziness  No diplopia dysarthria dysphasia  Seems to swallow okay  Chronic difficulty with gait and balance but forgets to use her cane      General exam  Blood pressure 134/54, pulse 76  HEENT says that she can hear okay  Has some bruising on her limbs is on Eliquis  Lungs clear  Heart rate regular  Abdomen soft  Symmetrical pulses  No edema the feet      Neurologic exam  Alert and attentive  Prosody speech normal  Naming normal  Repetition normal  Comprehension normal  In the past with make occasional paraphasic errors  No severe aphasia today  Memory in the past 7 out of 30 for slums  Does not know current events does not know the president does not know the year  Poor insight but pleasant  No neglect  Right left discrimination okay  Easily perplexed    Cranials 2 through 12 normal  No hemiplegia  No nystagmus  Visual fields intact  Face symmetrical  Tongue twisters good    Upper extremities  No drift no tremor normal finger-nose    Lower extremities  Distal proximal strength good    Gait  Ambulates independently but is little impulsive needs to careful          Assessment/plan:    Currently, she has:    1. Dementia with some nightmares,  question Alzheimer s versus Lewy body with leaning towards Alzheimer s more, end stage with some nightmares.  (Started on Seroquel 12.5 mg nightly by primary sleeping better)  2. Chronic left cerebellar stroke with ataxia.  3. Intracranial atherosclerosis.  4. Syncope, not a good candidate for Aricept with the bradycardia.  Also had V. tach arrhythmias on a Holter monitor 3/3/2021  5. Fall with hitting her head 7/9/2019 CT scan negative for subdural hematoma if getting worse in any way would need a repeat to look for delayed subdural as she is on the Xarelto  6. Syncope with low blood pressure end of May 2020 taken to [facility]        1.  Alzheimer's disease (G30.9)       Namenda 10 mg twice daily       Severe memory falloff past slums score 7 out of 30       History of syncope not a good candidate with history of bradycardia for Aricept       Seroquel 12.5 mg at nighttime per primary MD due to nightmares    Concerned that patient is elderly and has had strokes and now has dementia   is trying to do the best he can keep her safe      2.  Cerebrovascular accident (CVA) (I63.342)        Left cerebellar stroke       Difficulty with balance, discussed gait safety and risks       Aortic valve replacement and paroxysmal atrial fibrillation on Eliquis       Patient is on Eliquis controlled by her other physicians risk factor reduction per her other physicians       intracranial atherosclerosis      3.  Episodic dizziness/syncope       Cardiology work-up 2021-03-03, Holter runs of V. tach correlated with dizziness       Due to age and dementia further pursued and work-up were declined      Predominantly discussed good nutrition gait safety trying to avoid injuries  Patient tolerates the Seroquel at low-dose 12.5 mg at nighttime  Sometimes sees people in the room but no severe hallucinations  Elderly  cares for the patient they shop at [employer]  There is a neighbor who plows their driveway    No follow-up  in 6 months    Discussed the above issues with patient and   23 minutes total care time today            Again, thank you for allowing me to participate in the care of your patient.        Sincerely,        Lna Shin MD

## 2022-01-01 ENCOUNTER — LAB REQUISITION (OUTPATIENT)
Dept: LAB | Facility: CLINIC | Age: 87
End: 2022-01-01
Payer: MEDICARE

## 2022-01-01 DIAGNOSIS — Z29.9 ENCOUNTER FOR PROPHYLACTIC MEASURES, UNSPECIFIED: ICD-10-CM

## 2022-01-01 LAB
ANION GAP SERPL CALCULATED.3IONS-SCNC: 18 MMOL/L (ref 7–15)
BUN SERPL-MCNC: 27.3 MG/DL (ref 8–23)
CALCIUM SERPL-MCNC: 9.3 MG/DL (ref 8.2–9.6)
CHLORIDE SERPL-SCNC: 105 MMOL/L (ref 98–107)
CREAT SERPL-MCNC: 1.36 MG/DL (ref 0.51–0.95)
DEPRECATED HCO3 PLAS-SCNC: 21 MMOL/L (ref 22–29)
GFR SERPL CREATININE-BSD FRML MDRD: 37 ML/MIN/1.73M2
GLUCOSE SERPL-MCNC: 69 MG/DL (ref 70–99)
POTASSIUM SERPL-SCNC: 4.2 MMOL/L (ref 3.4–5.3)
SODIUM SERPL-SCNC: 144 MMOL/L (ref 136–145)

## 2022-01-01 PROCEDURE — 36415 COLL VENOUS BLD VENIPUNCTURE: CPT | Mod: ORL | Performed by: INTERNAL MEDICINE

## 2022-01-01 PROCEDURE — 80048 BASIC METABOLIC PNL TOTAL CA: CPT | Mod: ORL | Performed by: INTERNAL MEDICINE

## 2022-01-01 PROCEDURE — P9603 ONE-WAY ALLOW PRORATED MILES: HCPCS | Mod: ORL | Performed by: INTERNAL MEDICINE

## 2022-03-13 ENCOUNTER — HOSPITAL ENCOUNTER (EMERGENCY)
Facility: HOSPITAL | Age: 87
Discharge: HOME | End: 2022-03-13
Attending: EMERGENCY MEDICINE | Admitting: EMERGENCY MEDICINE
Payer: MEDICARE

## 2022-03-13 ENCOUNTER — HOSPITAL ENCOUNTER (EMERGENCY)
Facility: HOSPITAL | Age: 87
Discharge: HOME | End: 2022-03-13
Attending: EMERGENCY MEDICINE
Payer: COMMERCIAL

## 2022-03-13 ENCOUNTER — APPOINTMENT (OUTPATIENT)
Dept: RADIOLOGY | Facility: HOSPITAL | Age: 87
End: 2022-03-13
Attending: EMERGENCY MEDICINE
Payer: MEDICARE

## 2022-03-13 ENCOUNTER — APPOINTMENT (OUTPATIENT)
Dept: CT IMAGING | Facility: HOSPITAL | Age: 87
End: 2022-03-13
Attending: EMERGENCY MEDICINE
Payer: MEDICARE

## 2022-03-13 VITALS
TEMPERATURE: 98.7 F | RESPIRATION RATE: 18 BRPM | DIASTOLIC BLOOD PRESSURE: 92 MMHG | OXYGEN SATURATION: 99 % | BODY MASS INDEX: 22.2 KG/M2 | WEIGHT: 130 LBS | HEIGHT: 64 IN | HEART RATE: 78 BPM | SYSTOLIC BLOOD PRESSURE: 176 MMHG

## 2022-03-13 DIAGNOSIS — S09.90XA HEAD INJURY, INITIAL ENCOUNTER: ICD-10-CM

## 2022-03-13 DIAGNOSIS — Z86.59 HISTORY OF DEMENTIA: ICD-10-CM
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
## 2022-03-13 DIAGNOSIS — Z79.01 ANTICOAGULATED BY ANTICOAGULATION TREATMENT: ICD-10-CM

## 2022-03-13 DIAGNOSIS — S51.001A AVULSION OF SKIN OF ELBOW, RIGHT, INITIAL ENCOUNTER: ICD-10-CM

## 2022-03-13 LAB
ALBUMIN UR-MCNC: NEGATIVE MG/DL
AMORPH CRY #/AREA URNS HPF: ABNORMAL /HPF
ANION GAP SERPL CALCULATED.3IONS-SCNC: 12 MMOL/L (ref 5–18)
APPEARANCE UR: CLEAR
BACTERIA #/AREA URNS HPF: ABNORMAL /HPF
BASOPHILS # BLD AUTO: 0 10E3/UL (ref 0–0.2)
BASOPHILS NFR BLD AUTO: 1 %
BILIRUB UR QL STRIP: NEGATIVE
BUN SERPL-MCNC: 31 MG/DL (ref 8–28)
CALCIUM SERPL-MCNC: 10 MG/DL (ref 8.5–10.5)
CHLORIDE BLD-SCNC: 103 MMOL/L (ref 98–107)
CO2 SERPL-SCNC: 26 MMOL/L (ref 22–31)
COLOR UR AUTO: ABNORMAL
CREAT SERPL-MCNC: 1.66 MG/DL (ref 0.6–1.1)
EOSINOPHIL # BLD AUTO: 0.3 10E3/UL (ref 0–0.7)
EOSINOPHIL NFR BLD AUTO: 5 %
ERYTHROCYTE [DISTWIDTH] IN BLOOD BY AUTOMATED COUNT: 13.7 % (ref 10–15)
GFR SERPL CREATININE-BSD FRML MDRD: 29 ML/MIN/1.73M2
GLUCOSE BLD-MCNC: 78 MG/DL (ref 70–125)
GLUCOSE UR STRIP-MCNC: NEGATIVE MG/DL
HCT VFR BLD AUTO: 40.3 % (ref 35–47)
HGB BLD-MCNC: 13.2 G/DL (ref 11.7–15.7)
HGB UR QL STRIP: NEGATIVE
HYALINE CASTS: 1 /LPF
IMM GRANULOCYTES # BLD: 0 10E3/UL
IMM GRANULOCYTES NFR BLD: 0 %
KETONES UR STRIP-MCNC: NEGATIVE MG/DL
LEUKOCYTE ESTERASE UR QL STRIP: ABNORMAL
LYMPHOCYTES # BLD AUTO: 1.6 10E3/UL (ref 0.8–5.3)
LYMPHOCYTES NFR BLD AUTO: 33 %
MCH RBC QN AUTO: 34.5 PG (ref 26.5–33)
MCHC RBC AUTO-ENTMCNC: 32.8 G/DL (ref 31.5–36.5)
MCV RBC AUTO: 105 FL (ref 78–100)
MONOCYTES # BLD AUTO: 0.7 10E3/UL (ref 0–1.3)
MONOCYTES NFR BLD AUTO: 13 %
NEUTROPHILS # BLD AUTO: 2.3 10E3/UL (ref 1.6–8.3)
NEUTROPHILS NFR BLD AUTO: 48 %
NITRATE UR QL: NEGATIVE
NRBC # BLD AUTO: 0 10E3/UL
NRBC BLD AUTO-RTO: 0 /100
PH UR STRIP: 7 [PH] (ref 5–7)
PLATELET # BLD AUTO: 164 10E3/UL (ref 150–450)
POTASSIUM BLD-SCNC: 4.4 MMOL/L (ref 3.5–5)
RBC # BLD AUTO: 3.83 10E6/UL (ref 3.8–5.2)
RBC URINE: 2 /HPF
SODIUM SERPL-SCNC: 141 MMOL/L (ref 136–145)
SP GR UR STRIP: 1.01 (ref 1–1.03)
SQUAMOUS EPITHELIAL: <1 /HPF
TROPONIN I SERPL-MCNC: 0.01 NG/ML (ref 0–0.29)
UROBILINOGEN UR STRIP-MCNC: <2 MG/DL
WBC # BLD AUTO: 4.9 10E3/UL (ref 4–11)
WBC CLUMPS #/AREA URNS HPF: PRESENT /HPF
WBC URINE: 21 /HPF

## 2022-03-13 PROCEDURE — 258N000003 HC RX IP 258 OP 636: Performed by: EMERGENCY MEDICINE

## 2022-03-13 PROCEDURE — 36415 COLL VENOUS BLD VENIPUNCTURE: CPT | Performed by: EMERGENCY MEDICINE

## 2022-03-13 PROCEDURE — 87088 URINE BACTERIA CULTURE: CPT | Performed by: EMERGENCY MEDICINE

## 2022-03-13 PROCEDURE — 99285 EMERGENCY DEPT VISIT HI MDM: CPT | Mod: 25

## 2022-03-13 PROCEDURE — 70450 CT HEAD/BRAIN W/O DYE: CPT

## 2022-03-13 PROCEDURE — 85025 COMPLETE CBC W/AUTO DIFF WBC: CPT | Performed by: EMERGENCY MEDICINE

## 2022-03-13 PROCEDURE — 84484 ASSAY OF TROPONIN QUANT: CPT | Performed by: EMERGENCY MEDICINE

## 2022-03-13 PROCEDURE — 73070 X-RAY EXAM OF ELBOW: CPT | Mod: RT

## 2022-03-13 PROCEDURE — 72125 CT NECK SPINE W/O DYE: CPT

## 2022-03-13 PROCEDURE — 93005 ELECTROCARDIOGRAM TRACING: CPT | Performed by: EMERGENCY MEDICINE

## 2022-03-13 PROCEDURE — 71045 X-RAY EXAM CHEST 1 VIEW: CPT

## 2022-03-13 PROCEDURE — 81001 URINALYSIS AUTO W/SCOPE: CPT | Performed by: EMERGENCY MEDICINE

## 2022-03-13 PROCEDURE — 82310 ASSAY OF CALCIUM: CPT | Performed by: EMERGENCY MEDICINE

## 2022-03-13 RX ORDER — CEPHALEXIN 500 MG/1
500 CAPSULE ORAL ONCE
Status: DISCONTINUED | OUTPATIENT
Start: 2022-03-13 | End: 2022-03-13

## 2022-03-13 RX ORDER — LIDOCAINE 40 MG/G
CREAM TOPICAL
Status: DISCONTINUED | OUTPATIENT
Start: 2022-03-13 | End: 2022-03-13 | Stop reason: HOSPADM

## 2022-03-13 RX ORDER — CEPHALEXIN 500 MG/1
500 CAPSULE ORAL 2 TIMES DAILY
Qty: 14 CAPSULE | Refills: 0 | Status: SHIPPED | OUTPATIENT
Start: 2022-03-13 | End: 2022-03-13

## 2022-03-13 RX ADMIN — SODIUM CHLORIDE 250 ML: 9 INJECTION, SOLUTION INTRAVENOUS at 08:03

## 2022-03-13 ASSESSMENT — ENCOUNTER SYMPTOMS
DIARRHEA: 0
BACK PAIN: 0
WOUND: 1
NECK PAIN: 0
VOMITING: 0
DYSURIA: 0
FEVER: 0

## 2022-03-13 NOTE — ED TRIAGE NOTES
Pt found laying in bathtub, possibly hit her head, lump on back of her head, unsure of how she got into the tub, skin tear on right elbow, lives with her , pt denies pain at this time.

## 2022-03-13 NOTE — ED NOTES
Bed: JNED-19  Expected date: 3/13/22  Expected time: 5:52 AM  Means of arrival: Ambulance  Comments:  [place]  89 F, fall on thinners, hit head

## 2022-03-13 NOTE — ED NOTES
The abrasion on the left elbow was dressed with telfa and wrapped with gauze. The patient's  was given additional supplies to redress the wound if needed. The  was educated on the need to seek medical care if the wound looks inflamed, or if there is pus or drainage coming from the wound. The  was also told that if the patient develops a fever or becomes unusually confused to bring the patient back to the emergency room.

## 2022-03-13 NOTE — DISCHARGE INSTRUCTIONS
Read and follow the discharge instructions.    The picture of your wife head did not show any bleeding inside of the brain.  There is no broken bones on the neck, the chest or the elbow.    Your wife may have a urine infection.    Keep the elbow clean and dry.  The Steri-Strips will fall off on their own.    You may give your wife Tylenol as needed for pain.    Call your primary care doctor tomorrow to make a follow-up appointment for reevaluation    If possible make sure you are with your wife at all times when she gets out to make sure she does not    Call 911 if your wife is vomiting not acting as usual has difficulty walking or any other concerns.

## 2022-03-13 NOTE — ED PROVIDER NOTES
EMERGENCY DEPARTMENT ENCOUNTER      NAME: Theresa Thumes  AGE: 89 year old female  YOB: 1931  MRN: [id number]  EVALUATION DATE & TIME: 3/13/2022  5:57 AM    PCP: No primary care provider on file.    ED PROVIDER: Suha Parra M.D.      CHIEF COMPLAINT     Chief Complaint   Patient presents with     Fall     possible head injury         FINAL IMPRESSION:     1. Fall, initial encounter    2. Head injury, initial encounter    3. Avulsion of skin of elbow, right, initial encounter    4. History of dementia    5. Anticoagulated by anticoagulation treatment          MEDICAL DECISION MAKING:       Pertinent Labs & Imaging studies reviewed. (See chart for details)    89 year old female presents to the Emergency Department for evaluation of fall    Patient presents from home.  Initial history    By EMS.  She lives with her .  Has a history of Alzheimer dementia.  She fell.  Did notice an abrasion to the right elbow.  She is on Eliquis.    On evaluation patient is awake alert oriented to self.  Does not know why she is here.  Is able to tell me yes or no she is having pain.  Obvious abrasion to the right elbow.  Hematoma to the left occiput.  Trauma alert called.     6:25 AM  now at bedside.  States he heard her wife calling from the bathroom.  He could tell that she just finished urinating in the toilet and she had fallen.  She was awake and alert and calling out for him.  She otherwise have been doing well no recent illnesses.  Patient at bedside.  Recognizes her  as being a very funny man.  She denies any physical complaints.    Previous records reviewed.  Patient has a history of outer valve replacement.  And is on Eliquis.  History of Alzheimer's dementia previous previous history of CVA.    CT head no acute intracranial hemorrhage.  A left posterior skull hematoma.  Chest x-ray no pneumothorax.  No acute.  C-spine degenerative no fractures.    Re evalauted denies any needs.  Wound  clean and Steri-Strip placed by nurse.    Mild renal insufficient creatinine 1.66 BUN of 31.  We will give a small 250 cc bolus.    UA with white blood cell counts plus leukocyte esterase bacteria.  Patient is quite sharp when I ask her about what her she says it does not burn or hurt when she urinates therefore will await cultures.   notified.    she has been able to tolerate liquids have been able to ambulate with her cane.  She is eager to go home.     very caring and feels comfortable taking her home I offered to transport her via hospital transfer but she drove here and states he can take her.    Encouraged him to follow primary care doctor head injury instructions were given wound precautions were given.  Discharged in stable condition.    Clinical impression and decision making 89-year-old female status post likely accidental fall was in the toilet and she had just finished urinating and immediately called for her  noted to have an abrasion to the left elbow and a contusion to the back of the head anticoagulated for history of previous aortic valve replacement.  Pleasantly demented here able to complain whether she is having pain or not has been doing well at home.  trauma evaluation negative wound clean and cover.  She is at her baseline.   very caring discharge in stable condition.      Differential Diagnosis (include but not limited to)  Trauma intracranial hemorrhage abrasion contusion cervical fracture electrolyte abnormalities anemia arrhythmia acute coronary syndrome sepsis urinary tract infection accidental fall among others      Vital Signs: Hypertension  EKG: Sinus rhythm first-degree AV block  Imaging: CT head CT C-spine chest x-ray right elbow  Home Meds: Reviewed  ED meds/abx: none  Fluids: 250 ml NS    Labs  K 4.4  Cr 0.66  Wbc 4.9  Hgb 13.2  platelets 164        Review of Previous Records  Dementia with some nightmares, question Alzheimer s versus Lewy body with  leaning towards Alzheimer s more, end stage with some nightmares.  (Started on Seroquel 12.5 mg nightly by primary sleeping better)  2. Chronic left cerebellar stroke with ataxia.  3. Intracranial atherosclerosis.  4. Syncope, not a good candidate for Aricept with the bradycardia.  Also had V. tach arrhythmias on a Holter monitor 3/3/2021  5. Fall with hitting her head 7/9/2019 CT scan negative for subdural hematoma if getting worse in any way would need a repeat to look for delayed subdural as she is on the Xarelto  6. Syncope with low blood pressure end of May 2020 taken to [facility]          1.  Alzheimer's disease (G30.9)       Namenda 10 mg twice daily       Severe memory falloff past slums score 7 out of 30       History of syncope not a good candidate with history of bradycardia for Aricept       Seroquel 12.5 mg at nighttime per primary MD due to nightmares     Concerned that patient is elderly and has had strokes and now has dementia   is trying to do the best he can keep her safe        2.  Cerebrovascular accident (CVA) (I63.342)        Left cerebellar stroke       Difficulty with balance, discussed gait safety and risks       Aortic valve replacement and paroxysmal atrial fibrillation on Eliquis       Patient is on Eliquis controlled by her other physicians risk factor reduction per her other physicians       intracranial atherosclerosis        3.  Episodic dizziness/syncope       Cardiology work-up 2021-03-03, Holter runs of V. tach correlated with dizziness       Due to age and dementia further pursued and work-up were declined       Cerebellar stroke on the left as above.   Aortic valve stenosis with aortic valve replacement.  Paroxysmal atrial fibrillation   Hypertension.   Pyloric stenosis.   Episodic hyponatremia in November 2016.   History of shoulder arthroscopy and hip surgery.   Poor memory with SLUMS score of 7/31 while hospitalized.      ECHOCARDIOGRAM: 8/20/2019  1.  Normal  left ventricular size with normal systolic performance with visually estimated ejection fraction of 60% to 65%.   2.  No regional wall motion abnormalities are noted.   3.  Normal right ventricular size and systolic performance.   4.  Bioprosthetic aortic valve with normal appearance with mean gradient of 13 mmHg and trivial insufficiency.  5.  Borderline LVH  6.  Mild bi-atrial enlargement        Consults      ED COURSE     6:04 AM I met with the patient to gather history and to perform my initial exam. I discussed the plan for care while in the Emergency Department. PPE (gloves, glasses, surgical cap, N95 mask) was worn during patient encounters.     6:21 AM I rechecked on the patient and spoke to the patient's .     6:54 AM I rechecked on the patient and updated  on results.     8:08 AM reevaluated and  updated. Patient denies dysuria    9:03 AM I rechecked on the patient and updated them on results. We discussed plans for discharge including supportive cares, symptomatic treatment, outpatient follow up, and reasons to return to the emergency department.    At the conclusion of the encounter I discussed the results of all of the tests and the disposition. The questions were answered. The  acknowledged understanding and was agreeable with the care plan.           MEDICATIONS GIVEN IN THE EMERGENCY:     Medications   lidocaine 1 % 0.1-1 mL (has no administration in time range)   lidocaine (LMX4) cream (has no administration in time range)   sodium chloride (PF) 0.9% PF flush 3 mL (3 mLs Intracatheter Not Given 3/13/22 0857)   sodium chloride (PF) 0.9% PF flush 3 mL (has no administration in time range)   0.9% sodium chloride BOLUS (0 mLs Intravenous Stopped 3/13/22 0830)       NEW PRESCRIPTIONS STARTED AT TODAY'S ER VISIT     There are no discharge medications for this patient.         =================================================================    HPI     Patient information was  "obtained from: EMS    Use of : N/A        Theresa Thumes is a 89 year old female with history of HTN, aortic valve replacement, paroxysmal atrial fibrillation, left cerebellar stroke (2018), dementia, who presents by EMS from home for evaluation of fall.     Per EMS, patient was found by her  in the bathtub. She has a skin tear on her right arm and there is a possible head injury as patient has a \"bump\" on her head. Patient is anticoagulated on Eliquis.  called 911.      REVIEW OF SYSTEMS   Review of Systems   Constitutional: Negative for fever.   HENT:        Positive for posterior head pain with palpation.    Cardiovascular: Negative for chest pain.   Gastrointestinal: Negative for diarrhea and vomiting.   Genitourinary: Negative for dysuria.   Musculoskeletal: Negative for back pain and neck pain.        Positive for elbow pain, but otherwise denies any other leg or arm pain.    Skin: Positive for wound (skin tear on right elbow).   All other systems reviewed and are negative.   Review of systems provided by the  and wife.     PAST MEDICAL HISTORY:   History reviewed. No pertinent past medical history.    PAST SURGICAL HISTORY:   History reviewed. No pertinent surgical history.      CURRENT MEDICATIONS:   No current outpatient medications on file.       ALLERGIES:     Allergies   Allergen Reactions     Epinephrine Difficulty breathing     The patient experienced a reaction to dental procedure involving Novocain with epinephrine.        FAMILY HISTORY:   History reviewed. No pertinent family history.    SOCIAL HISTORY:     Social History     Socioeconomic History     Marital status: Not on file     Spouse name: Not on file     Number of children: Not on file     Years of education: Not on file     Highest education level: Not on file   Occupational History     Not on file   Tobacco Use     Smoking status: Not on file     Smokeless tobacco: Not on file   Substance and Sexual Activity " "    Alcohol use: Not on file     Drug use: Not on file     Sexual activity: Not on file   Other Topics Concern     Not on file   Social History Narrative     Not on file     Social Determinants of Health     Financial Resource Strain: Not on file   Food Insecurity: Not on file   Transportation Needs: Not on file   Physical Activity: Not on file   Stress: Not on file   Social Connections: Not on file   Intimate Partner Violence: Not on file   Housing Stability: Not on file       VITALS:   BP (!) 176/92   Pulse 78   Temp 98.7  F (37.1  C) (Oral)   Resp 18   Ht 1.626 m (5' 4\")   Wt 59 kg (130 lb)   SpO2 99%   BMI 22.31 kg/m      PHYSICAL EXAM     Physical Exam  Vitals and nursing note reviewed. Exam conducted with a chaperone present.   Constitutional:       General: She is not in acute distress.     Appearance: Normal appearance. She is normal weight. She is not ill-appearing, toxic-appearing or diaphoretic.      Comments: Nontoxic cooperative and pleasant and pleasant demented oriented to self is able to respond whether she is having pain she complains of pain at the IV site.  Younger than stated age.   HENT:      Head:      Comments: Hematoma left occiput no active bleeding     Right Ear: Tympanic membrane and ear canal normal.      Left Ear: Tympanic membrane and ear canal normal.      Ears:      Comments: No hemotympanum     Nose: Nose normal.      Mouth/Throat:      Mouth: Mucous membranes are dry.   Eyes:      Extraocular Movements: Extraocular movements intact.      Pupils: Pupils are equal, round, and reactive to light.      Comments: No ecchymosis   Cardiovascular:      Rate and Rhythm: Normal rate and regular rhythm.   Pulmonary:      Effort: Pulmonary effort is normal.      Breath sounds: Normal breath sounds.   Abdominal:      Palpations: Abdomen is soft.      Comments: Surgical scar right upper quadrant midline.  Soft nontender.   Musculoskeletal:        Arms:       Cervical back: Normal range of " motion and neck supple.      Comments: Bilateral shoulders well-healed surgical scars.  Left knee surgical scar  Skin avulsion right elbow  Full range of motion bilateral wrist elbow shoulders bilateral ankles knees and hips.  No gross midline cervical thoracic, there is palpation pelvis stable.     Skin:     General: Skin is warm.      Capillary Refill: Capillary refill takes less than 2 seconds.      Comments: Skin tag right elbow otherwise there is no ecchymosis hematomas or abrasions.   Neurological:      Mental Status: She is alert.      Comments: Oriented to self only   Psychiatric:         Mood and Affect: Mood normal.                 LAB:     All pertinent labs reviewed and interpreted.  Labs Ordered and Resulted from Time of ED Arrival to Time of ED Departure   BASIC METABOLIC PANEL - Abnormal       Result Value    Sodium 141      Potassium 4.4      Chloride 103      Carbon Dioxide (CO2) 26      Anion Gap 12      Urea Nitrogen 31 (*)     Creatinine 1.66 (*)     Calcium 10.0      Glucose 78      GFR Estimate 29 (*)    ROUTINE UA WITH MICROSCOPIC REFLEX TO CULTURE - Abnormal    Color Urine Light Yellow      Appearance Urine Clear      Glucose Urine Negative      Bilirubin Urine Negative      Ketones Urine Negative      Specific Gravity Urine 1.012      Blood Urine Negative      pH Urine 7.0      Protein Albumin Urine Negative      Urobilinogen Urine <2.0      Nitrite Urine Negative      Leukocyte Esterase Urine 250 Jen/uL (*)     Bacteria Urine Few (*)     WBC Clumps Urine Present (*)     Amorphous Crystals Urine Few (*)     RBC Urine 2      WBC Urine 21 (*)     Squamous Epithelials Urine <1      Hyaline Casts Urine 1     CBC WITH PLATELETS AND DIFFERENTIAL - Abnormal    WBC Count 4.9      RBC Count 3.83      Hemoglobin 13.2      Hematocrit 40.3       (*)     MCH 34.5 (*)     MCHC 32.8      RDW 13.7      Platelet Count 164      % Neutrophils 48      % Lymphocytes 33      % Monocytes 13      %  Eosinophils 5      % Basophils 1      % Immature Granulocytes 0      NRBCs per 100 WBC 0      Absolute Neutrophils 2.3      Absolute Lymphocytes 1.6      Absolute Monocytes 0.7      Absolute Eosinophils 0.3      Absolute Basophils 0.0      Absolute Immature Granulocytes 0.0      Absolute NRBCs 0.0     TROPONIN I - Normal    Troponin I 0.01     URINE CULTURE        RADIOLOGY:     Reviewed all pertinent imaging. Please see official radiology report.  Elbow XR, 2 views, right   Final Result   IMPRESSION: Anatomic alignment left elbow. No acute displaced left elbow fracture. No appreciable elbow joint effusion. Moderate elbow osteoarthritis, more pronounced at the radiocapitellar compartment with radial head spurring. Diffuse bone    demineralization.         XR Chest 1 View   Final Result   IMPRESSION: Clear lungs. Normal heart size and pulmonary vascularity. No pleural fluid or pneumothorax. Post bilateral glenohumeral arthroplasty.      Cervical spine CT w/o contrast   Final Result   IMPRESSION:   1.  Advanced degenerative changes of the cervical spine. No evidence of an acute displaced fracture.         Head CT w/o contrast   Final Result   IMPRESSION:   1.  No CT evidence for acute intracranial process.   2.  Brain atrophy and presumed chronic microvascular ischemic changes as above.   3.  Left posterior scalp swelling. No evidence of an underlying calvarial fracture.           EKG:     EKG #1  Sinus rhythm first-degree AV block.  Poor anterior R wave progression.  Left axis deviation.  Inverted T wave in aVL.  S1 centimeters system elevation in V3.      Time:070319/070341    Ventricular rate 69 bmp  Axis left axis deviation  NM interval 224 /218ms  QRS duration 106/110ms  QT/PTQ823/441  ms    Compared to previous EKG on April 22, 2018 poor anterior progression was seen before 1 cm segment elevation seen in V3 V4.  Inverted T wave in aVL seen before.  First-degree AV bribes from before.  Left axis deviation seen  before.  I have independently reviewed and interpreted the EKG(s) documented above.      PROCEDURES:     Procedures      I, Marie Colbert, am serving as a scribe to document services personally performed by Dr. Parra based on my observation and the provider's statements to me. I, Suha Parar MD attest that Marie Colbert is acting in a scribe capacity, has observed my performance of the services and has documented them in accordance with my direction.    Suha Parra M.D.  Emergency Medicine  [facility] EMERGENCY DEPARTMENT  [address] [place] [id number] [phone]  Dept: [phone]       Suha Parra MD  03/13/22 0911

## 2022-03-16 LAB — BACTERIA UR CULT: ABNORMAL

## 2022-03-30 LAB
ATRIAL RATE - MUSE: 69 BPM
DIASTOLIC BLOOD PRESSURE - MUSE: NORMAL MMHG
INTERPRETATION ECG - MUSE: NORMAL
P AXIS - MUSE: 66 DEGREES
PR INTERVAL - MUSE: 224 MS
QRS DURATION - MUSE: 106 MS
QT - MUSE: 412 MS
QTC - MUSE: 441 MS
R AXIS - MUSE: -42 DEGREES
SYSTOLIC BLOOD PRESSURE - MUSE: NORMAL MMHG
T AXIS - MUSE: 79 DEGREES
VENTRICULAR RATE- MUSE: 69 BPM

## 2022-04-09 ENCOUNTER — HEALTH MAINTENANCE LETTER (OUTPATIENT)
Age: 87
End: 2022-04-09

## 2022-04-12 ENCOUNTER — APPOINTMENT (OUTPATIENT)
Dept: RADIOLOGY | Facility: HOSPITAL | Age: 87
End: 2022-04-12
Attending: EMERGENCY MEDICINE
Payer: MEDICARE

## 2022-04-12 ENCOUNTER — APPOINTMENT (OUTPATIENT)
Dept: CT IMAGING | Facility: HOSPITAL | Age: 87
End: 2022-04-12
Attending: EMERGENCY MEDICINE
Payer: MEDICARE

## 2022-04-12 ENCOUNTER — HOSPITAL ENCOUNTER (EMERGENCY)
Facility: HOSPITAL | Age: 87
Discharge: HOME | End: 2022-04-12
Attending: EMERGENCY MEDICINE | Admitting: EMERGENCY MEDICINE
Payer: MEDICARE

## 2022-04-12 VITALS
HEART RATE: 75 BPM | DIASTOLIC BLOOD PRESSURE: 89 MMHG | HEIGHT: 64 IN | SYSTOLIC BLOOD PRESSURE: 176 MMHG | OXYGEN SATURATION: 100 % | TEMPERATURE: 97.8 F | BODY MASS INDEX: 23.05 KG/M2 | RESPIRATION RATE: 18 BRPM | WEIGHT: 135 LBS

## 2022-04-12 DIAGNOSIS — S51.012A SKIN TEAR OF LEFT ELBOW WITHOUT COMPLICATION, INITIAL ENCOUNTER: ICD-10-CM
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
## 2022-04-12 DIAGNOSIS — S40.012A CONTUSION OF LEFT SHOULDER, INITIAL ENCOUNTER: ICD-10-CM

## 2022-04-12 LAB
ANION GAP SERPL CALCULATED.3IONS-SCNC: 12 MMOL/L (ref 5–18)
APTT PPP: 28 SECONDS (ref 22–38)
BUN SERPL-MCNC: 32 MG/DL (ref 8–28)
CALCIUM SERPL-MCNC: 9.8 MG/DL (ref 8.5–10.5)
CHLORIDE BLD-SCNC: 104 MMOL/L (ref 98–107)
CO2 SERPL-SCNC: 26 MMOL/L (ref 22–31)
CREAT SERPL-MCNC: 1.38 MG/DL (ref 0.6–1.1)
ERYTHROCYTE [DISTWIDTH] IN BLOOD BY AUTOMATED COUNT: 13.6 % (ref 10–15)
GFR SERPL CREATININE-BSD FRML MDRD: 36 ML/MIN/1.73M2
GLUCOSE BLD-MCNC: 90 MG/DL (ref 70–125)
HCT VFR BLD AUTO: 40.4 % (ref 35–47)
HGB BLD-MCNC: 13.4 G/DL (ref 11.7–15.7)
INR PPP: 1.11 (ref 0.85–1.15)
MCH RBC QN AUTO: 35 PG (ref 26.5–33)
MCHC RBC AUTO-ENTMCNC: 33.2 G/DL (ref 31.5–36.5)
MCV RBC AUTO: 106 FL (ref 78–100)
PLATELET # BLD AUTO: 158 10E3/UL (ref 150–450)
POTASSIUM BLD-SCNC: 4.4 MMOL/L (ref 3.5–5)
RBC # BLD AUTO: 3.83 10E6/UL (ref 3.8–5.2)
SODIUM SERPL-SCNC: 142 MMOL/L (ref 136–145)
WBC # BLD AUTO: 6 10E3/UL (ref 4–11)

## 2022-04-12 PROCEDURE — 73030 X-RAY EXAM OF SHOULDER: CPT | Mod: LT

## 2022-04-12 PROCEDURE — 72131 CT LUMBAR SPINE W/O DYE: CPT

## 2022-04-12 PROCEDURE — 85730 THROMBOPLASTIN TIME PARTIAL: CPT | Performed by: EMERGENCY MEDICINE

## 2022-04-12 PROCEDURE — 99285 EMERGENCY DEPT VISIT HI MDM: CPT | Mod: 25

## 2022-04-12 PROCEDURE — 70450 CT HEAD/BRAIN W/O DYE: CPT

## 2022-04-12 PROCEDURE — 85027 COMPLETE CBC AUTOMATED: CPT | Performed by: EMERGENCY MEDICINE

## 2022-04-12 PROCEDURE — 36415 COLL VENOUS BLD VENIPUNCTURE: CPT | Performed by: EMERGENCY MEDICINE

## 2022-04-12 PROCEDURE — 73080 X-RAY EXAM OF ELBOW: CPT | Mod: LT

## 2022-04-12 PROCEDURE — 80048 BASIC METABOLIC PNL TOTAL CA: CPT | Performed by: EMERGENCY MEDICINE

## 2022-04-12 PROCEDURE — 85610 PROTHROMBIN TIME: CPT | Performed by: EMERGENCY MEDICINE

## 2022-04-12 PROCEDURE — 72125 CT NECK SPINE W/O DYE: CPT

## 2022-04-12 PROCEDURE — 72128 CT CHEST SPINE W/O DYE: CPT

## 2022-04-12 NOTE — ED PROVIDER NOTES
"ED Provider In Triage Note  [facility]  Encounter Date: Apr 12, 2022    Chief Complaint   Patient presents with     Fall     trama alert       Brief HPI:   Linda Selby is a 89 year old female presenting to the Emergency Department with a chief complaint of left arm pain after a fall this morning.     Unknown if she hit her head; she denies headache. Reports neck pain; no midline tenderness to palpation of cervical spine.    Reports generalized back pain.    Takes Eliquis.     Brief Physical Exam:  BP (!) 176/89   Pulse 75   Temp 97.8  F (36.6  C) (Oral)   Resp 18   Ht 1.626 m (5' 4\")   Wt 61.2 kg (135 lb)   SpO2 95%   BMI 23.17 kg/m    General: Non-toxic appearing  HEENT: Atraumatic  NECK: no midline tenderness to palpation cervical spine  Resp: No respiratory distress; clear lungs  Cardiac: RRR  Abdomen: soft, non-tender  EXTREMITIES: left shoulder deformity suggestive of dislocation; large skin tear over left elbow   Neuro: Alert, oriented, answers questions appropriately; cranial nerves grossly intact, no focal motor deficits  Psych: Behavior appropriate      Plan Initiated in Triage:  Trauma Alert (on Eliquis)  Head CT and cervical spine CT  CT thoracic and lumbar spines  X-rays left shoulder and left elbow  Blood work     PIT Dispo:   Suspect left shoulder dislocation - will need room when available    Fabienne Morrell MD on 4/12/2022 at 2:41 PM    Patient was evaluated by the Physician in Triage due to a limitation of available rooms in the Emergency Department. A plan of care was discussed based on the information obtained on the initial evaluation and patient was consuled to return back to the Emergency Department lobby after this initial evalutaiton until results were obtained or a room became available in the Emergency Department. Patient was counseled not to leave prior to receiving the results of their workup.        Fabienne Morrell MD  04/12/22 " 1989

## 2022-04-12 NOTE — ED NOTES
pts  instructed and verbalized understanding of dressing change to L elbow skin tear. Bacitracin, telfa and bonilla wrap for next few days. Watch for signs of infection. Supplies given.

## 2022-04-12 NOTE — ED NOTES
Pts  is her main caregiver. He was offered but declined home care referral. States family nearby checking in with them freq.

## 2022-04-12 NOTE — ED TRIAGE NOTES
Pt had an unwitnessed fall this am. Lives with .  Is complaining of left arm and shoulder pain. Bob hitting her head. On low dose elequest.

## 2022-04-12 NOTE — ED PROVIDER NOTES
EMERGENCY DEPARTMENT ENCOUNTER      NAME: Linda Selby  AGE: 89 year old female  YOB: 1931  MRN: [id number]  EVALUATION DATE & TIME: 4/12/2022  3:23 PM    PCP: Jag Lawson    ED PROVIDER: Trevor Carlson M.D.      Chief Complaint   Patient presents with     Fall     trama alert         FINAL IMPRESSION:  Skin tear left elbow  Left shoulder contusion  Fall      ED COURSE & MEDICAL DECISION MAKING:    Pertinent Labs & Imaging studies reviewed. (See chart for details)  89 year old female presents to the Emergency Department for evaluation of potential injuries after an unwitnessed fall.  Patient confused chronically.  Unable to provide details.   reports her and her call out from the basement.  Her only report is of discomfort to the left shoulder presently.  Seen in triage.  Laboratory evaluation and imaging obtained.  Patient a trauma alert given her mechanism and use Eliquis.  Patient without evidence of scalp contusion.  Neck supple and nontender.  No clavicular tenderness.  Good range of motion of all extremities except for left shoulder slightly uncomfortable.  Faint contusion over the anterior deltoid shield on the left.. Patient appears non toxic with stable vitals signs. Overall exam is benign.    3:50 PM I met with the patient for the initial interview and physical examination. Discussed plan for treatment and workup in the ED.    5:35 PM.  CBC unremarkable other than gross cytosis.  Basic metabolic with slight elevation of BUN and creatinine.  INR normal at 1.11.  CT imaging of the head and spine unremarkable other than age-related changes.  Left shoulder without evidence of acute fracture.  Prior surgical repair noted.  Age-indeterminate proximal radius fracture on the left.  However no effusions to suggest acute process.  Patient with apparent fall with details unknown secondary to underlying confusion.  However no outward signs of injury.  Will ambulate to assess for  safety.  5:53 PM.  Patient ambulated without difficulties.   states she is acting normally.  Presently report no in-home health aides.  Family members do visit frequently.  Nurse will discuss situation briefly with care manager for potential home visit.  Skin tear to left elbow dressed       At the conclusion of the encounter I discussed the results of all of the tests and the disposition. The questions were answered and return precautions provided. The patient or family acknowledged understanding and was agreeable with the care plan.       PPE: Provider wore gloves, N95 mask, eye protection, surgical cap.     MEDICATIONS GIVEN IN THE EMERGENCY:  Medications - No data to display    NEW PRESCRIPTIONS STARTED AT TODAY'S ER VISIT  New Prescriptions    No medications on file          =================================================================    HPI    Patient information was obtained from: Patient and     Use of Intrepreter: N/A        Linda Selby is a 89 year old female with a pertient medical history of late onset Alzheimer's disease and HTN who presents to the ED for evaluation of a fall.    Patient has a bruise on her left shoulder, she reports she is unsure how it happened. Nurse reports the patient was on her way upstairs from the basement after taking a shower when she had an unwitnessed fall.  endorses the patient is on eliquis. Patient denies chest pain, rib pain, or right shoulder pain. Nurse notes the patient is at her baseline for confusion. Patient denies any other current complaints.    REVIEW OF SYSTEMS   Constitutional:  Denies fever, chills  Respiratory:  Denies productive cough or increased work of breathing  Cardiovascular:  Denies chest pain, palpitations  GI:  Denies abdominal pain, nausea, vomiting, or change in bowel or bladder habits   Musculoskeletal:  Denies any new muscle/joint swelling, or rib pain. Positive for left shoulder pain.  Skin:  Denies rash    Neurologic:  Denies focal weakness  All systems negative except as marked.     PAST MEDICAL HISTORY:  Past Medical History:   Diagnosis Date     Aortic valve stenosis      H/O aortic valve replacement      Hypertension        PAST SURGICAL HISTORY:  Past Surgical History:   Procedure Laterality Date     AORTIC VALVE REPLACEMENT       ARTHROPLASTY REVISION HIP Bilateral      ARTHROSCOPY SHOULDER Bilateral      CARDIAC SURGERY       ORTHOPEDIC SURGERY       PYLOROMYOTOMY           CURRENT MEDICATIONS:    No current facility-administered medications for this encounter.    Current Outpatient Medications:      apixaban ANTICOAGULANT (ELIQUIS) 5 MG tablet, Take 5 mg by mouth 1/2 a tab (5mg ) in the am and pm, Disp: , Rfl:      calcium carbonate-vitamin D (OS-BEHZAD) 600-400 MG-UNIT chewable tablet, Take 1 chew tab by mouth 2 times daily 660 mg 2 a day, Disp: , Rfl:      calcium citrate-vitamin D (CITRACAL) 315-200 MG-UNIT TABS per tablet, Take 1 tablet by mouth 2 times daily 2000, Disp: , Rfl:      Cyanocobalamin (VITAMIN B 12) 100 MCG LOZG, 1 time a day, Disp: , Rfl:      flecainide (TAMBOCOR) 50 MG tablet, , Disp: , Rfl:      ketorolac (ACULAR) 0.5 % ophthalmic solution, INSTILL 1 DROP IN LEFT EYE FOUR TIMES A DAY START AFTER PROCEDURE AND USE FOR 5 DAYS. (Patient not taking: Reported on 12/21/2021), Disp: , Rfl:      memantine (NAMENDA) 10 MG tablet, Take 1 tablet (10 mg) by mouth 2 times daily, Disp: 180 tablet, Rfl: 3     multivitamin w/minerals (MULTI-VITAMIN) tablet, Take 1 tablet by mouth daily, Disp: , Rfl:      QUEtiapine (SEROQUEL) 25 MG tablet, TAKE 1/2 TABLET BY MOUTH AT BEDTIME., Disp: , Rfl:      timolol maleate (TIMOPTIC) 0.5 % ophthalmic solution, TAKE 1 DROP(S) IN LEFT EYE ONCE IN THE MORNING, Disp: , Rfl:      travoprost ANA FREE (TRAVATAN Z) 0.004 % ophthalmic solution, 1 drop, Disp: , Rfl:      triamcinolone (KENALOG) 0.1 % external cream, 1 APPLICATION TWICE A DAY TOPICALLY APPLY TO AFFECTED AREAS  "TWICE DAILY FOR TWO WEEKS., Disp: , Rfl:     ALLERGIES:  Allergies   Allergen Reactions     Epinephrine Anaphylaxis     Aspirin      Epinephrine Difficulty breathing     The patient experienced a reaction to dental procedure involving Novocain with epinephrine.        FAMILY HISTORY:  Family History   Problem Relation Age of Onset     Cerebrovascular Disease Mother      Other - See Comments Father 94        Old age       SOCIAL HISTORY:   Social History     Socioeconomic History     Marital status:    Tobacco Use     Smoking status: Never Smoker     Smokeless tobacco: Never Used   Substance and Sexual Activity     Alcohol use: Not Currently     Comment: Very rare     Drug use: Not Currently     Sexual activity: Not Currently   Other Topics Concern     Parent/sibling w/ CABG, MI or angioplasty before 65F 55M? No     Caffeine Concern No     Sleep Concern No     Special Diet No     Comment: Avoid sweets     Exercise Yes     Comment: Walking     Seat Belt Yes   Social History Narrative    ** Merged History Encounter **            VITALS:  Patient Vitals for the past 24 hrs:   BP Temp Temp src Pulse Resp SpO2 Height Weight   04/12/22 1446 (!) 176/89 97.8  F (36.6  C) Oral 75 18 95 % 1.626 m (5' 4\") 61.2 kg (135 lb)        PHYSICAL EXAM    Constitutional:  Awake, alert, in no apparent distress.   HENT:  Normocephalic, Atraumatic. Bilateral external ears normal. Oropharynx moist. Nose normal. Neck- Normal range of motion with no guarding, No midline cervical tenderness, Supple, No stridor.   Eyes:  PERRL, EOMI with no signs of entrapment, Conjunctiva normal, No discharge.   Respiratory:  Normal breath sounds, No respiratory distress, No wheezing.    Cardiovascular:  Normal heart rate, Normal rhythm, No appreciable rubs or gallops.   GI:  Soft, No tenderness, No distension, No palpable masses  Musculoskeletal:  Intact distal pulses, No edema. Good range of motion in all major joints. No tenderness to palpation or " major deformities noted. Faint ecchymosis to anterior left deltoid shield.  Integument:  Warm, Dry, No erythema, No rash.   Neurologic:  Alert & oriented, Normal motor function, Normal sensory function, No focal deficits noted.   Psychiatric:  Affect normal, Mood normal. Confusion.    LAB:  All pertinent labs reviewed and interpreted.  Results for orders placed or performed during the hospital encounter of 04/12/22   Head CT w/o contrast     Status: None    Narrative    EXAM: CT HEAD W/O CONTRAST  LOCATION: [facility]  DATE/TIME: 4/12/2022 3:19 PM    INDICATION: Head injury.  COMPARISON: None.  TECHNIQUE: Routine CT Head without IV contrast. Multiplanar reformats. Dose reduction techniques were used.    FINDINGS:  INTRACRANIAL CONTENTS: No intracranial hemorrhage, extraaxial collection, or mass effect.  No CT evidence of acute infarct. Moderate presumed chronic small vessel ischemic changes. Moderate generalized volume loss. No hydrocephalus.     VISUALIZED ORBITS/SINUSES/MASTOIDS: Prior bilateral cataract surgery. Visualized portions of the orbits are otherwise unremarkable. No paranasal sinus mucosal disease. No middle ear or mastoid effusion.    BONES/SOFT TISSUES: No acute abnormality.      Impression    IMPRESSION:  1.  No CT evidence for acute intracranial process.  2.  Brain atrophy and presumed chronic microvascular ischemic changes as above.   Cervical spine CT w/o contrast     Status: None    Narrative    EXAM: CT CERVICAL SPINE W/O CONTRAST  LOCATION: [facility]  DATE/TIME: 4/12/2022 3:20 PM    INDICATION: Neck pain.  COMPARISON: CT cervical spine without contrast 07/09/2019.  TECHNIQUE: Routine CT Cervical Spine without IV contrast. Multiplanar reformats. Dose reduction techniques were used.    FINDINGS:  VERTEBRA: There is reversal usual cervical lordosis with the apex at C6. There is fusion across the C3-C4 disc space. Ankylosis of the bilateral C3-C4  facet joints. There is ankylosis of the right C5-C6 facet joint. No acute cervical spine fracture.     CANAL/FORAMINA: Multilevel cervical spondylitic changes. Mild right neural foraminal stenosis at C4-C5. Mild left foraminal stenosis at C6-C7.    PARASPINAL: No extraspinal abnormality.      Impression    IMPRESSION:  1.  No acute cervical spine fracture.   CT Thoracic Spine w/o Contrast     Status: None    Narrative    EXAM: CT THORACIC SPINE W/O CONTRAST  LOCATION: [facility]  DATE/TIME: 4/12/2022 3:20 PM    INDICATION: Back pain.  COMPARISON: None.  TECHNIQUE: Routine CT Thoracic Spine without IV contrast. Multiplanar reformats. Dose reduction techniques were used.     FINDINGS:  VERTEBRA: Osteopenia. Rightward convex curvature of the thoracolumbar junction. No fracture or posttraumatic subluxation.     CANAL/FORAMINA: No canal or neural foraminal stenosis.    PARASPINAL: No extraspinal abnormality.      Impression    IMPRESSION:  1.  No acute thoracic spine fracture.     Lumbar spine CT w/o contrast     Status: None    Narrative    EXAM: CT LUMBAR SPINE W/O CONTRAST  LOCATION: [facility]  DATE/TIME: 4/12/2022 3:20 PM    INDICATION: Back pain.  COMPARISON: None.  TECHNIQUE: Routine CT Lumbar Spine without IV contrast. Multiplanar reformats. Dose reduction techniques were used.     FINDINGS:  VERTEBRA: Osteopenia. Rightward convex curvature of the lumbar spine with the apex at L1-L2. 3 mm right lateral subluxation of L2 on L3 and 5 mm right lateral subluxation of L3 on L4. Exaggerated lumbar spine lordosis. Grade 1 anterolisthesis of L4 on L5   and L5 on S1. Grade 1 retrolisthesis of L2 on L3 and L3 on L4. There is ankylosis of the right L4-L5 facet joint. No acute lumbar spine fracture.     CANAL/FORAMINA: Severe degenerative facet changes at L5-S1. Mild degenerative facet changes at L3-L4. Severe bilateral neural foraminal stenosis at L5-S1. Moderate right  neural foraminal stenosis at L4-L5. Moderate left neural foraminal stenosis at   L1-L2.    PARASPINAL: Atherosclerotic vascular calcifications of aorta and bilateral common iliac arteries. Multiple calcifications in the spleen.      Impression    IMPRESSION:  1.  No acute lumbar spine fracture.   XR Shoulder Left 2 Views     Status: None    Narrative    EXAM: XR SHOULDER 2 VIEW LEFT  LOCATION: [facility]  DATE/TIME: 4/12/2022 3:28 PM    INDICATION: pain after fall  COMPARISON: None.      Impression    IMPRESSION: Status post left total shoulder arthroplasty. No hardware complication. No acute fracture is identified. There is normal joint alignment. Moderate acromioclavicular joint degenerative changes with chondrocalcinosis. Osteopenia. Aortic   atherosclerosis.   Elbow  XR, G/E 3 views, left     Status: None    Narrative    EXAM: XR ELBOW LEFT G/E 3 VIEWS  LOCATION: [facility]  DATE/TIME: 4/12/2022 3:28 PM    INDICATION: Left elbow pain after a fall.  COMPARISON: None.      Impression    IMPRESSION:  1.  Left elbow joint effusion.  2.  Linear sclerosis and probable mild posterior impaction in the left radius neck, suspicious for age-indeterminate fracture. Consider follow-up radiographs in further evaluation.  3.  No joint malalignment.  4.  Mild-moderate left elbow degenerative arthrosis.   CBC (+ platelets, no diff)     Status: Abnormal   Result Value Ref Range    WBC Count 6.0 4.0 - 11.0 10e3/uL    RBC Count 3.83 3.80 - 5.20 10e6/uL    Hemoglobin 13.4 11.7 - 15.7 g/dL    Hematocrit 40.4 35.0 - 47.0 %     (H) 78 - 100 fL    MCH 35.0 (H) 26.5 - 33.0 pg    MCHC 33.2 31.5 - 36.5 g/dL    RDW 13.6 10.0 - 15.0 %    Platelet Count 158 150 - 450 10e3/uL   Basic metabolic panel     Status: Abnormal   Result Value Ref Range    Sodium 142 136 - 145 mmol/L    Potassium 4.4 3.5 - 5.0 mmol/L    Chloride 104 98 - 107 mmol/L    Carbon Dioxide (CO2) 26 22 - 31 mmol/L     Anion Gap 12 5 - 18 mmol/L    Urea Nitrogen 32 (H) 8 - 28 mg/dL    Creatinine 1.38 (H) 0.60 - 1.10 mg/dL    Calcium 9.8 8.5 - 10.5 mg/dL    Glucose 90 70 - 125 mg/dL    GFR Estimate 36 (L) >60 mL/min/1.73m2   INR     Status: Normal   Result Value Ref Range    INR 1.11 0.85 - 1.15   PTT     Status: Normal   Result Value Ref Range    aPTT 28 22 - 38 Seconds       RADIOLOGY:  Reviewed all pertinent imaging. Please see official radiology report.  .risr          I, Nafisa Pacheco, am serving as a scribe to document services personally performed by Trevor Carlson MD, based on my observation and the provider's statements to me. I, Trevor Carlson MD attest that Nafisa Pacheco is acting in a scribe capacity, has observed my performance of the services and has documented them in accordance with my direction.    Trevor Carlson M.D.  Emergency Medicine  [facility] EMERGENCY DEPARTMENT     Trevor Carlson MD  04/12/22 [id number]

## 2022-10-09 ENCOUNTER — HEALTH MAINTENANCE LETTER (OUTPATIENT)
Age: 87
End: 2022-10-09

## 2022-11-10 ENCOUNTER — TELEPHONE (OUTPATIENT)
Dept: NEUROLOGY | Facility: CLINIC | Age: 87
End: 2022-11-10

## 2022-11-10 NOTE — TELEPHONE ENCOUNTER
[facility] Call Center    Phone Message    May a detailed message be left on voicemail: yes     Reason for Call: Other: Patient's spouse called to say that she will need to be admitted to [facility] in [place] but needs diagnosis from Dr. Shin. Please contact spouse René to advise.     Action Taken: Message routed to:  Other: neurology    Travel Screening: Not Applicable

## 2022-11-11 NOTE — TELEPHONE ENCOUNTER
[facility] Call Center    Phone Message    May a detailed message be left on voicemail: yes     Reason for Call: Other: Pt spouse is returning a call. Please call back at [phone]     Action Taken: Message routed to:  Other: [facility]    Travel Screening: Not Applicable

## 2022-11-11 NOTE — TELEPHONE ENCOUNTER
Spoke to Tiago at [facility]. Writer let her know that pt's  is requesting office visit notes be faxed to them. She states notes are helpful for admission.  Notes faxed to [phone] Attn Tiago Shaikh LPN on 11/11/2022 at 1:33 PM

## 2022-11-11 NOTE — TELEPHONE ENCOUNTER
Spoke to pt's . He would like Dr. Shin office notes sent to [facility].  Will reach out to [facility] this afternoon.    Kendal Shaikh LPN on 11/11/2022 at 10:03 AM

## 2023-01-01 ENCOUNTER — HEALTH MAINTENANCE LETTER (OUTPATIENT)
Age: 88
End: 2023-01-01

## 2023-02-13 NOTE — PROGRESS NOTES
"Telephone follow-up visit requested by patient  Telephone follow-up visit due to the COVID-19 pandemic  Telephone number [phone]  Patient identified   helps with visit as patient is demented          .  The patient has been notified of following:     \"This telephone visit will be conducted via a call between you and your physician/provider. We have found that certain health care needs can be provided without the need for a physical exam. This service lets us provide the care you need with a short phone conversation. If a prescription is necessary we can send it directly to your pharmacy. If lab work is needed we can place an order for that and you can then stop by our lab to have the test done at a later time.    If during the course of the call the physician/provider feels a telephone visit is not appropriate, you will not be charged for this service.\"     Patient has given verbal consent for Telephone visit? Yes  Consent has been obtained for this service by 1 care team member: yes.                89 year-old with dementia    Previous slums score 7 out of 30  She is very sweet cordial well taking care of but really cannot recall much    On Namenda up to 10 mg twice daily she is tolerating this       since last seen  Did fall once when walking on the long without her cane  The line was very irregular  She did not hit her head did not lose consciousness it was not syncope  No headache no throwing up no new injuries no new weakness      Did have some nightmares and was placed on Seroquel by the primary  Currently taking Seroquel 25 mg tablet, half a tablet nightly to help with \"nightmares\" which are now better    No ongoing hallucinations     states that she eats okay she sleeps okay she does not wander at night                Review of last visit  Patient was out in the garden last Sunday end of May 2020, felt like she was going to pass out  helped her into a chair she sat there for a bit " 2/13/23  Returned Pt's call. No answer. Left message offering pt an appointment for 2/14 @ 1:30. Spot placed on hold waiting for pt to call back to confirm.    and then felt worse and then kind of came around and tried a walker inside which she tried to do but then she went down.   called 911 he tells me they were taken to [facility] and she stayed overnight  They felt she had hypotension her medication was too strong for blood pressure and they adjusted that downwards    She denies hitting her head she is on the Eliquis but does not have any headache or new neurologic symptoms today    Patient is quite demented though she asked her  to answer all the questions for her but she is polite and cordial                  Complex evaluation July 1019  Past history of left cerebellar stroke with ataxia  Alzheimer's type disease with memory falloff    Fell on the driveway July 9, 2019 is on Eliquis no hematoma found intracranially    Patient states that she was on the driveway she slipped she did not lose consciousness fell hit her face on the right side she did go for evaluation  CT scan head small vessel changes moderate volume loss no intracranial hemorrhage no skull fracture did have a scalp hematoma  CT scan facial bones right supraorbital hematoma but no fracture  CT cervical spine no fracture see official report degenerative disease          Current problems/neurologic difficulties    1. Chronic left cerebellar stroke.  2. Status post TPA due to left leg weakness on April 22, 2018.  3. Intracranial atherosclerosis with bilateral P2 and left A2/A3 vessels disease.  4. Had some difficulty with hyponatremia.  5. Had some difficulty with cognition.  6. Transient left-sided weakness with old cerebellar stroke.  7. Fell July 9, 2019 ecchymoses right super orbital ridge scalp hematoma no intracranial hemorrhage on scans    Workup in April 2018:    1. MRI scan of the brain:  a. No acute stroke or bleed, mild atrophy, chronic small vessel changes.  b. Chronic infarct left cerebellar hemisphere.  2. CT scan of the head, no hemorrhage, small vessel changes.  3. CTA  of the intracranial vessels:  a. Moderate stenosis, left A2-A3 segments.  b. Mild stenosis P2 cerebral arteries bilaterally.  4. CTA of the neck vessels, no significant stenosis.  5. HDL 76, .  6. Echo 69% ejection fraction, mild aortic stenosis, left atrium normal size.  7. SLUMS score significantly abnormal 7/30.  8.  B12 was 840 and a TSH was 3.62 back in April 2018 when she was in hospital at [facility].      Past medical history:    1. Cerebellar stroke on the left as above.  2. Aortic valve stenosis with aortic valve replacement.  3. Hypertension.  4. Pyloric stenosis.  5. Episodic hyponatremia in November 2016.  6. History of shoulder arthroscopy and hip surgery.  7. Poor memory with SLUMS score of 7/31 while hospitalized.    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    Family history is positive for   Mother with stroke.   Father lived to be 95.                       Review of Systems    Severely demented difficult  No headache no chest pain  No shortness of breath or abdominal pain  No nausea vomiting or diarrhea fever chills  Eating okay  No passing out  No diplopia dysarthria dysphasia  Seems to swallow okay  Chronic difficulty with gait and balance but forgets to use her cane           Patient actually can talk okay  Difficult to say but may have a mild paraphasic error    Past slums score 7 out of 30   patient is cordial and polite      Per   Cranials 2 through 12 significant for  Mild decreased hearing   states that her face is symmetrical    Moves her arms and legs well    Has more of a left leg clumsiness when she ambulates with a wide-based stance from her stroke    She should use a single prong cane more often but she kind of forgets to take it with her Assessment/Plan Alzheimer disease (G30.1) Namenda 10 mg p.o. twice daily  End-stage dementia 7 out of 30 on her Tor cognitive assessment score    Not a good candidate  "for Aricept as she is had bradycardia and frequent falls                  Currently, she has:    1. Dementia with some nightmares, question Alzheimer s versus Lewy body with leaning towards Alzheimer s more, end stage with some nightmares.  (Started on Seroquel 12.5 mg nightly by primary sleeping better)  2. Chronic left cerebellar stroke with ataxia.  3. Intracranial atherosclerosis.  4. Syncope, not a good candidate for Aricept with the bradycardia.  5. Fall with hitting her head 7/9/2019 CT scan negative for subdural hematoma if getting worse in any way would need a repeat to look for delayed subdural as she is on the Xarelto  6. Syncope with low blood pressure end of May 2020 taken to [facility]    Current plan:    1. Continue memantine which she is using 10 mg twice daily.  2. We discussed gait safety at length  3. Reviewed with  that if neurologic symptoms or signs change we need a follow-up CT scan of the head to look for delayed subdural hematoma  4. discussed her poor memory which is getting worse continue on the Namenda 10 mg twice daily  5. Follow-up in 6 months  6.  On 12.5 mg of Seroquel at nighttime for her \"nightmares\" doing better        Concerned that patient is elderly and has had strokes and now has dementia   is trying to do the best he can keep her safe  No new complaints today          Episode of syncope in the past  Alzheimer disease     Cerebrovascular accident (CVA) (I63.342)   Patient is on Eliquis controlled by her other physicians risk factor reduction per her other physicians    15 minutes of direct discussion time with patient and  about the above telephone visit.      "